# Patient Record
Sex: FEMALE | Race: WHITE | NOT HISPANIC OR LATINO | Employment: UNEMPLOYED | URBAN - METROPOLITAN AREA
[De-identification: names, ages, dates, MRNs, and addresses within clinical notes are randomized per-mention and may not be internally consistent; named-entity substitution may affect disease eponyms.]

---

## 2021-05-10 LAB
EXTERNAL HEPATITIS B SURFACE ANTIGEN: NEGATIVE
EXTERNAL HEPATITIS C AB: NEGATIVE
EXTERNAL RUBELLA QUALITATIVE: NORMAL
EXTERNAL VDRL: NONREACTIVE
HIV1 P24 AG SERPL QL IA: NEGATIVE

## 2021-05-17 ENCOUNTER — HOSPITAL ENCOUNTER (OUTPATIENT)
Dept: ULTRASOUND IMAGING | Facility: HOSPITAL | Age: 41
Discharge: HOME | End: 2021-05-17
Attending: OBSTETRICS & GYNECOLOGY

## 2021-06-11 ENCOUNTER — HOSPITAL ENCOUNTER (OUTPATIENT)
Dept: MRI IMAGING | Facility: HOSPITAL | Age: 41
Discharge: HOME | End: 2021-06-11

## 2021-06-11 ENCOUNTER — TRANSCRIBE ORDERS (OUTPATIENT)
Dept: ADMINISTRATIVE | Facility: HOSPITAL | Age: 41
End: 2021-06-11

## 2021-06-11 DIAGNOSIS — O02.1 FETAL DEMISE BEFORE 20 WEEKS WITH RETENTION OF DEAD FETUS: Primary | ICD-10-CM

## 2021-06-11 DIAGNOSIS — O02.1 FETAL DEMISE BEFORE 20 WEEKS WITH RETENTION OF DEAD FETUS: ICD-10-CM

## 2021-06-11 PROCEDURE — 72197 MRI PELVIS W/O & W/DYE: CPT

## 2021-06-11 PROCEDURE — A9577 INJ MULTIHANCE: HCPCS | Performed by: OBSTETRICS & GYNECOLOGY

## 2021-06-11 PROCEDURE — 0 GADOBENATE DIMEGLUMINE 529 MG/ML SOLUTION: Performed by: OBSTETRICS & GYNECOLOGY

## 2021-06-11 RX ADMIN — GADOBENATE DIMEGLUMINE 20 ML: 529 INJECTION, SOLUTION INTRAVENOUS at 15:42

## 2021-06-14 ENCOUNTER — PREP FOR SURGERY (OUTPATIENT)
Dept: OTHER | Facility: HOSPITAL | Age: 41
End: 2021-06-14

## 2021-06-14 ENCOUNTER — TRANSCRIBE ORDERS (OUTPATIENT)
Dept: LAB | Facility: HOSPITAL | Age: 41
End: 2021-06-14

## 2021-06-14 DIAGNOSIS — Z01.818 PREOP TESTING: Primary | ICD-10-CM

## 2021-06-14 DIAGNOSIS — O02.1 IUFD AT LESS THAN 20 WEEKS OF GESTATION: Primary | ICD-10-CM

## 2021-06-14 RX ORDER — TRISODIUM CITRATE DIHYDRATE AND CITRIC ACID MONOHYDRATE 500; 334 MG/5ML; MG/5ML
30 SOLUTION ORAL ONCE AS NEEDED
Status: CANCELLED | OUTPATIENT
Start: 2021-06-14

## 2021-06-14 RX ORDER — FAMOTIDINE 10 MG/ML
20 INJECTION, SOLUTION INTRAVENOUS 2 TIMES DAILY PRN
Status: CANCELLED | OUTPATIENT
Start: 2021-06-14

## 2021-06-14 RX ORDER — FAMOTIDINE 20 MG/1
20 TABLET, FILM COATED ORAL ONCE AS NEEDED
Status: CANCELLED | OUTPATIENT
Start: 2021-06-14

## 2021-06-14 RX ORDER — FAMOTIDINE 20 MG/1
20 TABLET, FILM COATED ORAL 2 TIMES DAILY PRN
Status: CANCELLED | OUTPATIENT
Start: 2021-06-14

## 2021-06-14 RX ORDER — HYDROCODONE BITARTRATE AND ACETAMINOPHEN 10; 325 MG/1; MG/1
1 TABLET ORAL EVERY 4 HOURS PRN
Status: CANCELLED | OUTPATIENT
Start: 2021-06-14 | End: 2021-06-21

## 2021-06-14 RX ORDER — MORPHINE SULFATE 5 MG/ML
5 INJECTION, SOLUTION INTRAMUSCULAR; INTRAVENOUS
Status: CANCELLED | OUTPATIENT
Start: 2021-06-14

## 2021-06-14 RX ORDER — ACETAMINOPHEN 325 MG/1
650 TABLET ORAL EVERY 6 HOURS
Status: CANCELLED | OUTPATIENT
Start: 2021-06-14

## 2021-06-14 RX ORDER — ONDANSETRON 4 MG/1
4 TABLET, FILM COATED ORAL EVERY 6 HOURS PRN
Status: CANCELLED | OUTPATIENT
Start: 2021-06-14

## 2021-06-14 RX ORDER — SODIUM CHLORIDE 0.9 % (FLUSH) 0.9 %
10 SYRINGE (ML) INJECTION AS NEEDED
Status: CANCELLED | OUTPATIENT
Start: 2021-06-14

## 2021-06-14 RX ORDER — FAMOTIDINE 10 MG/ML
20 INJECTION, SOLUTION INTRAVENOUS ONCE AS NEEDED
Status: CANCELLED | OUTPATIENT
Start: 2021-06-14

## 2021-06-14 RX ORDER — TERBUTALINE SULFATE 1 MG/ML
0.25 INJECTION, SOLUTION SUBCUTANEOUS AS NEEDED
Status: CANCELLED | OUTPATIENT
Start: 2021-06-14

## 2021-06-14 RX ORDER — HYDROCODONE BITARTRATE AND ACETAMINOPHEN 5; 325 MG/1; MG/1
1 TABLET ORAL EVERY 4 HOURS PRN
Status: CANCELLED | OUTPATIENT
Start: 2021-06-14 | End: 2021-06-21

## 2021-06-14 RX ORDER — OXYTOCIN-SODIUM CHLORIDE 0.9% IV SOLN 30 UNIT/500ML 30-0.9/5 UT/ML-%
125 SOLUTION INTRAVENOUS ONCE
Status: CANCELLED | OUTPATIENT
Start: 2021-06-14 | End: 2021-06-14

## 2021-06-14 RX ORDER — PROMETHAZINE HYDROCHLORIDE 12.5 MG/1
12.5 TABLET ORAL EVERY 6 HOURS PRN
Status: CANCELLED | OUTPATIENT
Start: 2021-06-14

## 2021-06-14 RX ORDER — DIPHENHYDRAMINE HCL 25 MG
25 CAPSULE ORAL EVERY 6 HOURS PRN
Status: CANCELLED | OUTPATIENT
Start: 2021-06-14

## 2021-06-14 RX ORDER — PROMETHAZINE HYDROCHLORIDE 25 MG/1
25 TABLET ORAL EVERY 6 HOURS PRN
Status: CANCELLED | OUTPATIENT
Start: 2021-06-14

## 2021-06-14 RX ORDER — MISOPROSTOL 200 UG/1
800 TABLET ORAL AS NEEDED
Status: CANCELLED | OUTPATIENT
Start: 2021-06-14

## 2021-06-14 RX ORDER — DIPHENHYDRAMINE HYDROCHLORIDE 50 MG/ML
25 INJECTION INTRAMUSCULAR; INTRAVENOUS EVERY 6 HOURS PRN
Status: CANCELLED | OUTPATIENT
Start: 2021-06-14

## 2021-06-14 RX ORDER — ACETAMINOPHEN 325 MG/1
650 TABLET ORAL EVERY 4 HOURS PRN
Status: CANCELLED | OUTPATIENT
Start: 2021-06-14

## 2021-06-14 RX ORDER — IBUPROFEN 800 MG/1
800 TABLET ORAL EVERY 8 HOURS SCHEDULED
Status: CANCELLED | OUTPATIENT
Start: 2021-06-14

## 2021-06-14 RX ORDER — ONDANSETRON 2 MG/ML
4 INJECTION INTRAMUSCULAR; INTRAVENOUS EVERY 6 HOURS PRN
Status: CANCELLED | OUTPATIENT
Start: 2021-06-14

## 2021-06-14 RX ORDER — LIDOCAINE HYDROCHLORIDE 10 MG/ML
5 INJECTION, SOLUTION EPIDURAL; INFILTRATION; INTRACAUDAL; PERINEURAL AS NEEDED
Status: CANCELLED | OUTPATIENT
Start: 2021-06-14

## 2021-06-14 RX ORDER — MISOPROSTOL 200 UG/1
200 TABLET ORAL
Status: CANCELLED | OUTPATIENT
Start: 2021-06-14 | End: 2021-06-15

## 2021-06-14 RX ORDER — METHYLERGONOVINE MALEATE 0.2 MG/ML
200 INJECTION INTRAVENOUS ONCE AS NEEDED
Status: CANCELLED | OUTPATIENT
Start: 2021-06-14

## 2021-06-14 RX ORDER — CARBOPROST TROMETHAMINE 250 UG/ML
250 INJECTION, SOLUTION INTRAMUSCULAR ONCE AS NEEDED
Status: CANCELLED | OUTPATIENT
Start: 2021-06-14

## 2021-06-14 RX ORDER — SODIUM CHLORIDE 0.9 % (FLUSH) 0.9 %
3 SYRINGE (ML) INJECTION EVERY 12 HOURS SCHEDULED
Status: CANCELLED | OUTPATIENT
Start: 2021-06-14

## 2021-06-14 RX ORDER — SODIUM CHLORIDE, SODIUM LACTATE, POTASSIUM CHLORIDE, CALCIUM CHLORIDE 600; 310; 30; 20 MG/100ML; MG/100ML; MG/100ML; MG/100ML
150 INJECTION, SOLUTION INTRAVENOUS CONTINUOUS
Status: CANCELLED | OUTPATIENT
Start: 2021-06-14

## 2021-06-14 NOTE — H&P
OB HISTORY AND PHYSICAL    40 y.o. female [id number] currently at 17+ weeks by an estimated due date of 2021.  Patient has intrauterine fetal demise diagnosed on .  Ultrasound indicates fetus at 15+4 weeks.    Prenatal care is complicated by: Advanced maternal age, heterozygote for factor V Leiden-plan for Lovenox postpartum    CC: IOL    HPI:  Presents with scheduled IOL    ROS: All negative except listed in HPI    OB History    x5.  History of SAB x2, blighted ovum x1    Prenatal Labs:  Reviewed, see PNR, labs of note flow positive, rubella immune    Past medical history: Factor V Leiden, heterozygote.  MTHFR with normal homocystine, migraines, seasonal allergies    Home Medications: Stopped baby aspirin, prenatal vitamin       Allergies: No known drug allergies    No past surgical history on file.    Social History: , Gaudencio.  Denies alcohol drug or tobacco use.    Family History: Non contributory    Immunizations: See prenatal record for Tdap, Flu, and/or Covid vaccinations    PHYSICAL EXAM:    Vitals: Reviewed in prenatal record and/or Epic/OBIX/AirStrip     VS of note: All within normal limits    General- NAD, alert and oriented, appropriate  Psych- normal mood, good memory  CV- Regular rhythm, no murnurs  Resp- CTA to bases, no wheezes  Abdomen- Gravid, non tender  Fundus-  Size: EFW 5 ounces, 128 g  Cvx-closed  No fetal heart rate      ASSESSMENT:  ~17+ weeks by LMP, 15+ weeks IUFD  ~ Factor V Leiden heterozygote with no personal history of thromboembolic disease      PLAN:  ~  Admit, Epidural prn  ~  IOL: Cytotec   ~  Counseling:Specifically with Cytotec, she understands that it is endorsed by the [employer] College of [employer]GY[employer], but not FDA approved for the induction of labor.The patient was counseled on the risks, benefits and alternatives of Induction.  Risks reviewed, but are not limited to: bleeding, transfusion, fetal intolerance,  (possibly emergent),  and uterine rupture.   She declines expectant management or  and desires induction.  All her questions have been answered to her satisfaction and she desires to proceed.  They understand approximately 50% chance of retained placenta and need for dilation and curettage.  ~  Plan of care NLT hospital course, R/B/A/potential SE, length 24-48 hours have been reviewed with patient and her , questions answered to her/his/their satisfaction.  Pt desires to proceed as above.      Electronically signed by Faith Leal DO, 21, 6:45 PM EDT.

## 2021-06-16 ENCOUNTER — HOSPITAL ENCOUNTER (INPATIENT)
Facility: HOSPITAL | Age: 41
LOS: 1 days | Discharge: HOME | End: 2021-06-17
Attending: OBSTETRICS & GYNECOLOGY | Admitting: OBSTETRICS & GYNECOLOGY

## 2021-06-16 DIAGNOSIS — O02.1 IUFD AT LESS THAN 20 WEEKS OF GESTATION: ICD-10-CM

## 2021-06-16 LAB
ABO GROUP BLD: NORMAL
ABO GROUP BLD: NORMAL
BLD GP AB SCN SERPL QL: NEGATIVE
DEPRECATED RDW RBC AUTO: 45.6 FL (ref 37–54)
ERYTHROCYTE [DISTWIDTH] IN BLOOD BY AUTOMATED COUNT: 13.5 % (ref 12.3–15.4)
HCT VFR BLD AUTO: 36.6 % (ref 34–46.6)
HGB BLD-MCNC: 12.7 G/DL (ref 12–15.9)
MCH RBC QN AUTO: 32.2 PG (ref 26.6–33)
MCHC RBC AUTO-ENTMCNC: 34.7 G/DL (ref 31.5–35.7)
MCV RBC AUTO: 92.7 FL (ref 79–97)
PLATELET # BLD AUTO: 253 10*3/MM3 (ref 140–450)
PMV BLD AUTO: 9.3 FL (ref 6–12)
RBC # BLD AUTO: 3.95 10*6/MM3 (ref 3.77–5.28)
RH BLD: POSITIVE
RH BLD: POSITIVE
T&S EXPIRATION DATE: NORMAL
WBC # BLD AUTO: 7.54 10*3/MM3 (ref 3.4–10.8)

## 2021-06-16 PROCEDURE — 85027 COMPLETE CBC AUTOMATED: CPT | Performed by: OBSTETRICS & GYNECOLOGY

## 2021-06-16 PROCEDURE — 86900 BLOOD TYPING SEROLOGIC ABO: CPT | Performed by: OBSTETRICS & GYNECOLOGY

## 2021-06-16 PROCEDURE — 25010000002 ENOXAPARIN PER 10 MG: Performed by: OBSTETRICS & GYNECOLOGY

## 2021-06-16 PROCEDURE — 88300 SURGICAL PATH GROSS: CPT | Performed by: OBSTETRICS & GYNECOLOGY

## 2021-06-16 PROCEDURE — 86900 BLOOD TYPING SEROLOGIC ABO: CPT

## 2021-06-16 PROCEDURE — 3E0P7VZ INTRODUCTION OF HORMONE INTO FEMALE REPRODUCTIVE, VIA NATURAL OR ARTIFICIAL OPENING: ICD-10-PCS | Performed by: OBSTETRICS & GYNECOLOGY

## 2021-06-16 PROCEDURE — 86901 BLOOD TYPING SEROLOGIC RH(D): CPT | Performed by: OBSTETRICS & GYNECOLOGY

## 2021-06-16 PROCEDURE — 86901 BLOOD TYPING SEROLOGIC RH(D): CPT

## 2021-06-16 PROCEDURE — 86850 RBC ANTIBODY SCREEN: CPT | Performed by: OBSTETRICS & GYNECOLOGY

## 2021-06-16 RX ORDER — HYDROCODONE BITARTRATE AND ACETAMINOPHEN 5; 325 MG/1; MG/1
1 TABLET ORAL EVERY 4 HOURS PRN
Status: DISCONTINUED | OUTPATIENT
Start: 2021-06-16 | End: 2021-06-17 | Stop reason: HOSPADM

## 2021-06-16 RX ORDER — IBUPROFEN 800 MG/1
800 TABLET ORAL EVERY 8 HOURS SCHEDULED
Status: DISCONTINUED | OUTPATIENT
Start: 2021-06-16 | End: 2021-06-16

## 2021-06-16 RX ORDER — SODIUM CHLORIDE 0.9 % (FLUSH) 0.9 %
10 SYRINGE (ML) INJECTION AS NEEDED
Status: DISCONTINUED | OUTPATIENT
Start: 2021-06-16 | End: 2021-06-17 | Stop reason: HOSPADM

## 2021-06-16 RX ORDER — TERBUTALINE SULFATE 1 MG/ML
0.25 INJECTION, SOLUTION SUBCUTANEOUS AS NEEDED
Status: DISCONTINUED | OUTPATIENT
Start: 2021-06-16 | End: 2021-06-17 | Stop reason: HOSPADM

## 2021-06-16 RX ORDER — ONDANSETRON 4 MG/1
4 TABLET, FILM COATED ORAL EVERY 8 HOURS PRN
Status: DISCONTINUED | OUTPATIENT
Start: 2021-06-16 | End: 2021-06-17 | Stop reason: HOSPADM

## 2021-06-16 RX ORDER — ONDANSETRON 2 MG/ML
4 INJECTION INTRAMUSCULAR; INTRAVENOUS EVERY 6 HOURS PRN
Status: DISCONTINUED | OUTPATIENT
Start: 2021-06-16 | End: 2021-06-17 | Stop reason: HOSPADM

## 2021-06-16 RX ORDER — HYDROCODONE BITARTRATE AND ACETAMINOPHEN 10; 325 MG/1; MG/1
1 TABLET ORAL EVERY 4 HOURS PRN
Status: DISCONTINUED | OUTPATIENT
Start: 2021-06-16 | End: 2021-06-17 | Stop reason: HOSPADM

## 2021-06-16 RX ORDER — ONDANSETRON 4 MG/1
4 TABLET, FILM COATED ORAL EVERY 6 HOURS PRN
Status: DISCONTINUED | OUTPATIENT
Start: 2021-06-16 | End: 2021-06-17 | Stop reason: HOSPADM

## 2021-06-16 RX ORDER — LIDOCAINE HYDROCHLORIDE 10 MG/ML
5 INJECTION, SOLUTION EPIDURAL; INFILTRATION; INTRACAUDAL; PERINEURAL AS NEEDED
Status: DISCONTINUED | OUTPATIENT
Start: 2021-06-16 | End: 2021-06-17 | Stop reason: HOSPADM

## 2021-06-16 RX ORDER — METHYLERGONOVINE MALEATE 0.2 MG/ML
200 INJECTION INTRAVENOUS ONCE AS NEEDED
Status: DISCONTINUED | OUTPATIENT
Start: 2021-06-16 | End: 2021-06-17 | Stop reason: HOSPADM

## 2021-06-16 RX ORDER — SODIUM CHLORIDE 0.9 % (FLUSH) 0.9 %
1-10 SYRINGE (ML) INJECTION AS NEEDED
Status: DISCONTINUED | OUTPATIENT
Start: 2021-06-16 | End: 2021-06-17 | Stop reason: HOSPADM

## 2021-06-16 RX ORDER — MISOPROSTOL 100 UG/1
400 TABLET ORAL ONCE
Status: COMPLETED | OUTPATIENT
Start: 2021-06-16 | End: 2021-06-16

## 2021-06-16 RX ORDER — ACETAMINOPHEN 325 MG/1
650 TABLET ORAL EVERY 4 HOURS PRN
Status: DISCONTINUED | OUTPATIENT
Start: 2021-06-16 | End: 2021-06-17 | Stop reason: HOSPADM

## 2021-06-16 RX ORDER — ACETAMINOPHEN 325 MG/1
650 TABLET ORAL EVERY 6 HOURS PRN
COMMUNITY
End: 2023-02-22

## 2021-06-16 RX ORDER — SODIUM CHLORIDE, SODIUM LACTATE, POTASSIUM CHLORIDE, CALCIUM CHLORIDE 600; 310; 30; 20 MG/100ML; MG/100ML; MG/100ML; MG/100ML
150 INJECTION, SOLUTION INTRAVENOUS CONTINUOUS
Status: DISCONTINUED | OUTPATIENT
Start: 2021-06-16 | End: 2021-06-17

## 2021-06-16 RX ORDER — CETIRIZINE HYDROCHLORIDE 10 MG/1
10 TABLET ORAL DAILY
COMMUNITY

## 2021-06-16 RX ORDER — CALCIUM CARBONATE 200(500)MG
2 TABLET,CHEWABLE ORAL 3 TIMES DAILY PRN
Status: DISCONTINUED | OUTPATIENT
Start: 2021-06-16 | End: 2021-06-17 | Stop reason: HOSPADM

## 2021-06-16 RX ORDER — FAMOTIDINE 10 MG/ML
20 INJECTION, SOLUTION INTRAVENOUS ONCE AS NEEDED
Status: DISCONTINUED | OUTPATIENT
Start: 2021-06-16 | End: 2021-06-17 | Stop reason: HOSPADM

## 2021-06-16 RX ORDER — DIPHENHYDRAMINE HYDROCHLORIDE 50 MG/ML
25 INJECTION INTRAMUSCULAR; INTRAVENOUS EVERY 6 HOURS PRN
Status: DISCONTINUED | OUTPATIENT
Start: 2021-06-16 | End: 2021-06-17 | Stop reason: HOSPADM

## 2021-06-16 RX ORDER — FOLIC ACID 1 MG/1
1 TABLET ORAL DAILY
COMMUNITY
End: 2021-06-17 | Stop reason: HOSPADM

## 2021-06-16 RX ORDER — FAMOTIDINE 10 MG/ML
20 INJECTION, SOLUTION INTRAVENOUS 2 TIMES DAILY PRN
Status: DISCONTINUED | OUTPATIENT
Start: 2021-06-16 | End: 2021-06-17 | Stop reason: HOSPADM

## 2021-06-16 RX ORDER — TRISODIUM CITRATE DIHYDRATE AND CITRIC ACID MONOHYDRATE 500; 334 MG/5ML; MG/5ML
30 SOLUTION ORAL ONCE AS NEEDED
Status: DISCONTINUED | OUTPATIENT
Start: 2021-06-16 | End: 2021-06-17 | Stop reason: HOSPADM

## 2021-06-16 RX ORDER — OXYTOCIN-SODIUM CHLORIDE 0.9% IV SOLN 30 UNIT/500ML 30-0.9/5 UT/ML-%
125 SOLUTION INTRAVENOUS ONCE
Status: COMPLETED | OUTPATIENT
Start: 2021-06-16 | End: 2021-06-16

## 2021-06-16 RX ORDER — SODIUM CHLORIDE 0.9 % (FLUSH) 0.9 %
3 SYRINGE (ML) INJECTION EVERY 12 HOURS SCHEDULED
Status: DISCONTINUED | OUTPATIENT
Start: 2021-06-16 | End: 2021-06-17

## 2021-06-16 RX ORDER — CARBOPROST TROMETHAMINE 250 UG/ML
250 INJECTION, SOLUTION INTRAMUSCULAR ONCE AS NEEDED
Status: DISCONTINUED | OUTPATIENT
Start: 2021-06-16 | End: 2021-06-17 | Stop reason: HOSPADM

## 2021-06-16 RX ORDER — PROMETHAZINE HYDROCHLORIDE 25 MG/1
25 TABLET ORAL EVERY 6 HOURS PRN
Status: DISCONTINUED | OUTPATIENT
Start: 2021-06-16 | End: 2021-06-17 | Stop reason: HOSPADM

## 2021-06-16 RX ORDER — FAMOTIDINE 20 MG/1
20 TABLET, FILM COATED ORAL ONCE AS NEEDED
Status: DISCONTINUED | OUTPATIENT
Start: 2021-06-16 | End: 2021-06-17 | Stop reason: HOSPADM

## 2021-06-16 RX ORDER — MORPHINE SULFATE 5 MG/ML
5 INJECTION, SOLUTION INTRAMUSCULAR; INTRAVENOUS
Status: DISCONTINUED | OUTPATIENT
Start: 2021-06-16 | End: 2021-06-17 | Stop reason: HOSPADM

## 2021-06-16 RX ORDER — MISOPROSTOL 100 UG/1
200 TABLET ORAL
Status: DISCONTINUED | OUTPATIENT
Start: 2021-06-16 | End: 2021-06-16

## 2021-06-16 RX ORDER — ASPIRIN 81 MG/1
81 TABLET, CHEWABLE ORAL DAILY
COMMUNITY
End: 2021-06-17 | Stop reason: HOSPADM

## 2021-06-16 RX ORDER — DIPHENHYDRAMINE HCL 25 MG
25 CAPSULE ORAL EVERY 6 HOURS PRN
Status: DISCONTINUED | OUTPATIENT
Start: 2021-06-16 | End: 2021-06-17 | Stop reason: HOSPADM

## 2021-06-16 RX ORDER — OXYTOCIN-SODIUM CHLORIDE 0.9% IV SOLN 30 UNIT/500ML 30-0.9/5 UT/ML-%
SOLUTION INTRAVENOUS
Status: COMPLETED
Start: 2021-06-16 | End: 2021-06-16

## 2021-06-16 RX ORDER — FAMOTIDINE 20 MG/1
20 TABLET, FILM COATED ORAL 2 TIMES DAILY PRN
Status: DISCONTINUED | OUTPATIENT
Start: 2021-06-16 | End: 2021-06-17 | Stop reason: HOSPADM

## 2021-06-16 RX ORDER — MISOPROSTOL 200 UG/1
TABLET ORAL
Status: COMPLETED
Start: 2021-06-16 | End: 2021-06-16

## 2021-06-16 RX ORDER — MISOPROSTOL 200 UG/1
800 TABLET ORAL AS NEEDED
Status: DISCONTINUED | OUTPATIENT
Start: 2021-06-16 | End: 2021-06-17 | Stop reason: HOSPADM

## 2021-06-16 RX ORDER — ACETAMINOPHEN 325 MG/1
650 TABLET ORAL EVERY 6 HOURS
Status: DISCONTINUED | OUTPATIENT
Start: 2021-06-16 | End: 2021-06-17 | Stop reason: HOSPADM

## 2021-06-16 RX ORDER — PROMETHAZINE HYDROCHLORIDE 12.5 MG/1
12.5 TABLET ORAL EVERY 6 HOURS PRN
Status: DISCONTINUED | OUTPATIENT
Start: 2021-06-16 | End: 2021-06-17 | Stop reason: HOSPADM

## 2021-06-16 RX ADMIN — Medication 3 ML: at 21:50

## 2021-06-16 RX ADMIN — SODIUM CHLORIDE, POTASSIUM CHLORIDE, SODIUM LACTATE AND CALCIUM CHLORIDE 150 ML/HR: 600; 310; 30; 20 INJECTION, SOLUTION INTRAVENOUS at 16:11

## 2021-06-16 RX ADMIN — ACETAMINOPHEN 650 MG: 325 TABLET ORAL at 23:50

## 2021-06-16 RX ADMIN — OXYTOCIN 30 UNITS: 10 INJECTION, SOLUTION INTRAMUSCULAR; INTRAVENOUS at 16:31

## 2021-06-16 RX ADMIN — MISOPROSTOL 200 MCG: 200 TABLET ORAL at 13:39

## 2021-06-16 RX ADMIN — MISOPROSTOL 200 MCG: 100 TABLET ORAL at 13:39

## 2021-06-16 RX ADMIN — Medication 3 ML: at 13:40

## 2021-06-16 RX ADMIN — OXYTOCIN-SODIUM CHLORIDE 0.9% IV SOLN 30 UNIT/500ML 30 UNITS: 30-0.9/5 SOLUTION at 16:31

## 2021-06-16 RX ADMIN — MISOPROSTOL 400 MCG: 100 TABLET ORAL at 13:40

## 2021-06-16 RX ADMIN — SODIUM CHLORIDE, POTASSIUM CHLORIDE, SODIUM LACTATE AND CALCIUM CHLORIDE 150 ML/HR: 600; 310; 30; 20 INJECTION, SOLUTION INTRAVENOUS at 08:44

## 2021-06-16 RX ADMIN — ACETAMINOPHEN 650 MG: 325 TABLET ORAL at 18:52

## 2021-06-16 RX ADMIN — ENOXAPARIN SODIUM 30 MG: 30 INJECTION SUBCUTANEOUS at 22:00

## 2021-06-16 NOTE — NURSING NOTE
FSN in to see Patient and her spouse shortly after delivery of their 17 gestation son. Patient states she was more tearful last Friday. She was able to answer multiple questions and is tearful at times. She has agreed to allow FSN to call for a follow up in a few days.   Infant information provided to the  as requested. Patient states she is spending the night. Momentos made and provided to patient and her spouse.

## 2021-06-16 NOTE — NURSING NOTE
VERNAN Called GM and discussed this fetal demise. Case # [id number] and talked with Sarahi Monzon.

## 2021-06-16 NOTE — L&D DELIVERY NOTE
Vaginal Delivery Note    Patient progressed to complete, complete after 2 doses of Cytotec vaginally. Her first dose was 200 mcg and second dose was 400 mcg. She felt pressure and had a little bleeding and fetus delivered with an intact bag and placenta. Small segment of accessory placenta was in the vagina and removed easily. On vaginal exam, cervix was closed with appropriate lochia noted.     Patient desired to visualize the fetus in the bag and then I performed rupture of membranes on the fetal warmer. Fluid was clear without odor and nonbloody. Fetus had a cord wrapped around the neck and body, very loosely. Gross anatomic survey was within normal limits. Male fetus confirmed. The umbilical cord was cut and the fetus was wrapped in a dry blanket, hat was placed and he was brought to the patient to hold.     Patient tolerated delivery very well, has excellent coping skills. I have discussed in detail the option for genetic testing and sending the fetus and/or placenta to pathology. At this time they have decided on no more children, and decline all further evaluation for etiology (decline sending placenta and/or fetus to pathology). They do not desire to bury the fetus.  has been in to see the patient. She declines any further  visits.   at bedside, very supportive.    Laceration: No     EBL:  20 mL       Date:  6/16/21   GA: 17+5 by WENDY, 15+ by US     Anesthesia: None         Infant: Weight: 64gms (2.3oz)  Length: 15cm (6in)   APGARs: NA           Complications: None        Faith Leal DO  06/16/21  16:46 EDT

## 2021-06-16 NOTE — CONSULTS
Pt is currently on L&D being induced at 17 weeks gestation due to a IUFD.  Baby is expected to be a boy.     Introductions are made and my role explained.    No needs at this time.

## 2021-06-17 VITALS
RESPIRATION RATE: 18 BRPM | HEIGHT: 67 IN | BODY MASS INDEX: 28.88 KG/M2 | TEMPERATURE: 98.4 F | HEART RATE: 61 BPM | WEIGHT: 184 LBS | OXYGEN SATURATION: 99 % | SYSTOLIC BLOOD PRESSURE: 111 MMHG | DIASTOLIC BLOOD PRESSURE: 96 MMHG

## 2021-06-17 PROBLEM — O02.1 IUFD AT LESS THAN 20 WEEKS OF GESTATION: Status: ACTIVE | Noted: 2021-06-17

## 2021-06-17 RX ORDER — ACETAMINOPHEN AND CODEINE PHOSPHATE 120; 12 MG/5ML; MG/5ML
1 SOLUTION ORAL DAILY
Qty: 28 TABLET | Refills: 12 | Status: SHIPPED | OUTPATIENT
Start: 2021-06-17 | End: 2022-06-27 | Stop reason: SDUPTHER

## 2021-06-17 RX ADMIN — ACETAMINOPHEN 650 MG: 325 TABLET ORAL at 05:31

## 2021-06-17 NOTE — PLAN OF CARE
Goal Outcome Evaluation:                 Problem:  Loss  Goal: Optimal Adjustment to Loss  Intervention: Support Patient and Family Grieving Process  Flowsheets (Taken 2021 9291)  Supportive Measures: active listening utilized  Family/Support System Care: support provided

## 2021-06-17 NOTE — DISCHARGE SUMMARY
OB Discharge Summary      Admit Date:  2021  Date of Delivery: 2021   Discharge Date: 21    Reason for Admission:  IUFD    Final Diagnosis:  `17w5d by WENDY, 15+ by US, IUFD, s/p delivery    Antepartum:  Prenatal care is complicated by:  Thrombophilia (FVL hetero) and Hx of IUFD    Intrapartum/Delivery:  OB Surgeon:  Faith Leal DO  Anesthesia: None  Delivery Type:   Perineum: Intact    Infant: male  infant;     Weight: 65 g (2.3 oz)      APGARS: 0  @ 1 minute / 0  @ 5 minutes    Hospital Course/Significant Findings:  Patient arrived for scheduled induction.  She had 2 doses of Cytotec vaginally and delivered fetus in call and placenta intact.  Patient coped very well with delivery, declined any pathologic or chromosomal analysis of fetus or placenta.    Discharge:    Disposition: Home     Discharge Medications      New Medications      Instructions Start Date   enoxaparin 30 MG/0.3ML solution syringe  Commonly known as: Lovenox   30 mg, Subcutaneous, Every 24 Hours Scheduled      norethindrone 0.35 MG tablet  Commonly known as: MICRONOR   0.35 mg, Oral, Daily         Continue These Medications      Instructions Start Date   acetaminophen 325 MG tablet  Commonly known as: TYLENOL   650 mg, Oral, Every 6 Hours PRN      cetirizine 10 MG tablet  Commonly known as: zyrTEC   10 mg, Oral, Daily      doxylamine 25 MG tablet  Commonly known as: UNISOM   25 mg, Oral, Nightly PRN         Stop These Medications    aspirin 81 MG chewable tablet     folic acid 1 MG tablet  Commonly known as: FOLVITE            Diet: Regular    Pelvic Rest: 6 weeks    Condition at discharge: Good    Follow up with: Faith Leal DO or provider of her choice    Follow up in: 1 weeks    Complications: None

## 2021-06-17 NOTE — PROGRESS NOTES
PostPartum/PostOp PROGRESS NOTE        Subjective:  Patient has no complaints, Pain controlled, Tolerating a regular diet, Ambulating, Urinating spontaneously and Lochia decreasing, no bleeding concerns      Objective:     Vitals: reviewed  General- NAd, alert and oriented, appropriate  Psych- normal mood, good memory  Abdomen- Soft, non distended, non tender    New labs/imagining/other reviewed, of note: NA       Assessment:    `Post-partum/postop Day:  1  `Hx of IUFD    Plan:     `Routine postpartum/postop care      `Discharge home, DC meds reviewed, Follow up scheduled, PP/PO precautions given          Electronically signed by Faith Leal DO, 06/17/21, 9:05 AM EDT.

## 2021-06-17 NOTE — NURSING NOTE
FSN in to see patient and her spouse this am. They are still very sure about hospital disposal. They appear to have good coping skills. FSN discussed possible breast fullness and management of this. FSN discussed periodic episodes of grief triggers and provided FSN contact information if they should have questions after discharge. Patient has agreed to a follow up phone call and provided an alternate phone number to call.

## 2021-06-18 LAB
LAB AP CASE REPORT: NORMAL
LAB AP CLINICAL INFORMATION: NORMAL
PATH REPORT.FINAL DX SPEC: NORMAL
PATH REPORT.GROSS SPEC: NORMAL

## 2021-06-30 ENCOUNTER — TELEPHONE (OUTPATIENT)
Dept: LACTATION | Facility: HOSPITAL | Age: 41
End: 2021-06-30

## 2021-06-30 NOTE — TELEPHONE ENCOUNTER
FSN attempted to call this patient after the loss of her pregnancy at 17 weeks but was unable to reach her. Patient had given this nurse permission to leave a message and FSN left a message to call as needed.

## 2022-06-24 NOTE — PROGRESS NOTES
"Well Woman Visit    CC: Scheduled annual well gyn visit  Chief Complaint   Patient presents with   • Gynecologic Exam     Questions about aspirin and folic acid       Myriad intake in the past?: No        Social History     Substance and Sexual Activity   Sexual Activity Yes   • Partners: Male   • Birth control/protection: OCP       HPI:   41 y.o.     Menses:   q 34 days, lasts 3-4 days, changes products q 8hrs on heaviest days, uses cup.     Pain:  None     Vulvar itching, all outside, no discharge    PCP: does not have PCP  History: PMHx, Meds, Allergies, PSHx, Social Hx, and POBHx all reviewed and updated.      PHYSICAL EXAM:  /72   Ht 175.3 cm (69\")   Wt 91.6 kg (202 lb)   LMP 2022   BMI 29.83 kg/m²  Not found.  General- NAD, alert and oriented, appropriate  Psych- Normal mood, good memory  Neck- No masses, no thyroid enlargement  CV- Regular rhythm, no murnurs  Resp- CTA to bases, no wheezes  Abdomen- Soft, non distended, non tender, no masses    Breast left-  Bilaterally symmetrical, no masses, non tender, no nipple discharge  Breast right- Bilaterally symmetrical, no masses, non tender, no nipple discharge    External genitalia- Normal female, no lesions  Urethra/meatus- Normal, no masses, non tender  Bladder- Normal, no masses, non tender  Vagina- Normal, no atrophy, no lesions, no discharge.  Prolapse: No prolapse  Cvx- Normal, no lesions, no discharge, No cervical motion tenderness  Uterus- Normal size, shape & consistency.  Non tender, mobile, & no prolapse  Adnexa- No mass, non tender  Anus/Rectum/Perineum- Not performed    Lymphatic- No palpable neck, axillary, or groin nodes  Ext- No edema, no cyanosis    Skin- No lesions, no rashes, no acanthosis nigricans      ASSESSMENT and PLAN:    Diagnoses and all orders for this visit:    1. Encounter for gynecological examination without abnormal finding (Primary)  -     IgP, Aptima HPV  -     Mammo Screening Digital Tomosynthesis " Bilateral With CAD  -     CBC (No Diff)  -     Comprehensive Metabolic Panel  -     Hemoglobin A1c  -     Lipid Panel  -     TSH    2. Birth control counseling  -     norethindrone (MICRONOR) 0.35 MG tablet; Take 1 tablet by mouth Daily.  Dispense: 84 tablet; Refill: 3    3. Other microscopic hematuria  -     Urine Culture - Urine, Urine, Clean Catch  -     Urinalysis With Microscopic - Urine, Clean Catch    4. Acute vulvitis  -     miconazole (MICOTIN) 2 % vaginal cream; Apply to itchy area on vulva nightly for 7 nights  Dispense: 7 g; Refill: 0    5. Weight gain  -     TSH  -     T4, Free    6. Dysuria  -     POC Urinalysis Dipstick    Non fasting labs today  Rec FU PCP re need for baby ASA and folic acid outside of preg    Preventative:  • BREAST HEALTH- Monthly self breast exam importance and how to reviewed. MMG and/or MRI (prn) reviewed per society guidelines and her individual history. Screen: Not medically needed  • CERVICAL CANCER Screening- Reviewed current ASCCP guidelines for screening w and wo cotest HPV, age specific.  Screen: Updated today  • COLON CANCER Screening- Reviewed current medical society guidelines and options.  Screen:  Not medically needed  • Importance of WEIGHT MANAGEMENT, nutrition, and exercise reviewed  • BONE HEALTH- Reviewed current medical society guidelines and options for testing, calcium and vit D intake.  Weight bearing exercise.  DEXA: Not medically needed  • VACCINATIONS Recommended: COVID and booster PRN, Flu annually, Gardisil/HPV vaccine (up to 46yo), Tdap [id number].  Importance discussed, risk being unvaccinated reviewed.  Questions answered  • Smoking status- NON SMOKER  • Follow up PCP/Specialist PMHx and Labs     MYRIAD: Qualifies for testing. She plans to check with her insurance and will return if desires.    She understands the importance of having any ordered tests to be performed in a timely fashion.  The risks of not performing them include, but are not limited  to, advanced cancer stages, bone loss from osteoporosis and/or subsequent increase in morbidity and/or mortality.  She is encouraged to review her results online and/or contact or office if she has questions.     Follow Up:  Return in about 1 year (around 6/27/2023) for WWE.            Faith Leal,   06/27/2022    [facility]  [address] [place] [address] [address] [place] [address]  Dept: [phone]  Dept Fax: [phone]  Loc: [phone]  Loc Fax: [phone]

## 2022-06-27 ENCOUNTER — OFFICE VISIT (OUTPATIENT)
Dept: OBSTETRICS AND GYNECOLOGY | Facility: CLINIC | Age: 42
End: 2022-06-27

## 2022-06-27 VITALS
SYSTOLIC BLOOD PRESSURE: 120 MMHG | HEIGHT: 69 IN | WEIGHT: 202 LBS | BODY MASS INDEX: 29.92 KG/M2 | DIASTOLIC BLOOD PRESSURE: 72 MMHG

## 2022-06-27 DIAGNOSIS — R30.0 DYSURIA: ICD-10-CM

## 2022-06-27 DIAGNOSIS — Z30.09 BIRTH CONTROL COUNSELING: ICD-10-CM

## 2022-06-27 DIAGNOSIS — N76.2 ACUTE VULVITIS: ICD-10-CM

## 2022-06-27 DIAGNOSIS — R63.5 WEIGHT GAIN: ICD-10-CM

## 2022-06-27 DIAGNOSIS — R31.29 OTHER MICROSCOPIC HEMATURIA: ICD-10-CM

## 2022-06-27 DIAGNOSIS — Z01.419 ENCOUNTER FOR GYNECOLOGICAL EXAMINATION WITHOUT ABNORMAL FINDING: Primary | ICD-10-CM

## 2022-06-27 LAB
ALBUMIN SERPL-MCNC: 4.4 G/DL (ref 3.5–5.2)
ALBUMIN/GLOB SERPL: 1.3 G/DL
ALP SERPL-CCNC: 87 U/L (ref 39–117)
ALT SERPL W P-5'-P-CCNC: 21 U/L (ref 1–33)
ANION GAP SERPL CALCULATED.3IONS-SCNC: 10.4 MMOL/L (ref 5–15)
AST SERPL-CCNC: 19 U/L (ref 1–32)
BACTERIA UR QL AUTO: ABNORMAL /HPF
BILIRUB BLD-MCNC: NEGATIVE MG/DL
BILIRUB SERPL-MCNC: 0.3 MG/DL (ref 0–1.2)
BILIRUB UR QL STRIP: NEGATIVE
BUN SERPL-MCNC: 9 MG/DL (ref 6–20)
BUN/CREAT SERPL: 12 (ref 7–25)
CALCIUM SPEC-SCNC: 9.7 MG/DL (ref 8.6–10.5)
CHLORIDE SERPL-SCNC: 102 MMOL/L (ref 98–107)
CHOLEST SERPL-MCNC: 197 MG/DL (ref 0–200)
CLARITY UR: CLEAR
CO2 SERPL-SCNC: 24.6 MMOL/L (ref 22–29)
COLOR UR: YELLOW
CREAT SERPL-MCNC: 0.75 MG/DL (ref 0.57–1)
DEPRECATED RDW RBC AUTO: 40.1 FL (ref 37–54)
EGFRCR SERPLBLD CKD-EPI 2021: 104 ML/MIN/1.73
ERYTHROCYTE [DISTWIDTH] IN BLOOD BY AUTOMATED COUNT: 12 % (ref 12.3–15.4)
GLOBULIN UR ELPH-MCNC: 3.4 GM/DL
GLUCOSE SERPL-MCNC: 113 MG/DL (ref 65–99)
GLUCOSE UR STRIP-MCNC: NEGATIVE MG/DL
GLUCOSE UR STRIP-MCNC: NEGATIVE MG/DL
HBA1C MFR BLD: 5.3 % (ref 4.8–5.6)
HCT VFR BLD AUTO: 39.5 % (ref 34–46.6)
HDLC SERPL-MCNC: 38 MG/DL (ref 40–60)
HGB BLD-MCNC: 14 G/DL (ref 12–15.9)
HGB UR QL STRIP.AUTO: ABNORMAL
HYALINE CASTS UR QL AUTO: ABNORMAL /LPF
KETONES UR QL STRIP: NEGATIVE
KETONES UR QL: NEGATIVE
LDLC SERPL CALC-MCNC: 125 MG/DL (ref 0–100)
LDLC/HDLC SERPL: 3.18 {RATIO}
LEUKOCYTE EST, POC: ABNORMAL
LEUKOCYTE ESTERASE UR QL STRIP.AUTO: ABNORMAL
MCH RBC QN AUTO: 32.3 PG (ref 26.6–33)
MCHC RBC AUTO-ENTMCNC: 35.4 G/DL (ref 31.5–35.7)
MCV RBC AUTO: 91 FL (ref 79–97)
NITRITE UR QL STRIP: NEGATIVE
NITRITE UR-MCNC: NEGATIVE MG/ML
PH UR STRIP.AUTO: 5.5 [PH] (ref 5–8)
PH UR: 5 [PH] (ref 5–8)
PLATELET # BLD AUTO: 263 10*3/MM3 (ref 140–450)
PMV BLD AUTO: 9.4 FL (ref 6–12)
POTASSIUM SERPL-SCNC: 3.8 MMOL/L (ref 3.5–5.2)
PROT SERPL-MCNC: 7.8 G/DL (ref 6–8.5)
PROT UR QL STRIP: NEGATIVE
PROT UR STRIP-MCNC: NEGATIVE MG/DL
RBC # BLD AUTO: 4.34 10*6/MM3 (ref 3.77–5.28)
RBC # UR STRIP: ABNORMAL /HPF
RBC # UR STRIP: ABNORMAL /UL
REF LAB TEST METHOD: ABNORMAL
SODIUM SERPL-SCNC: 137 MMOL/L (ref 136–145)
SP GR UR STRIP: 1.02 (ref 1–1.03)
SP GR UR: 1.02 (ref 1–1.03)
SQUAMOUS #/AREA URNS HPF: ABNORMAL /HPF
T4 FREE SERPL-MCNC: 0.95 NG/DL (ref 0.93–1.7)
TRIGL SERPL-MCNC: 190 MG/DL (ref 0–150)
TSH SERPL DL<=0.05 MIU/L-ACNC: 1.84 UIU/ML (ref 0.27–4.2)
UROBILINOGEN UR QL STRIP: ABNORMAL
UROBILINOGEN UR QL: NORMAL
VLDLC SERPL-MCNC: 34 MG/DL (ref 5–40)
WBC # UR STRIP: ABNORMAL /HPF
WBC NRBC COR # BLD: 6.36 10*3/MM3 (ref 3.4–10.8)

## 2022-06-27 PROCEDURE — 83036 HEMOGLOBIN GLYCOSYLATED A1C: CPT | Performed by: OBSTETRICS & GYNECOLOGY

## 2022-06-27 PROCEDURE — 81001 URINALYSIS AUTO W/SCOPE: CPT | Performed by: OBSTETRICS & GYNECOLOGY

## 2022-06-27 PROCEDURE — 87186 SC STD MICRODIL/AGAR DIL: CPT | Performed by: OBSTETRICS & GYNECOLOGY

## 2022-06-27 PROCEDURE — 99395 PREV VISIT EST AGE 18-39: CPT | Performed by: OBSTETRICS & GYNECOLOGY

## 2022-06-27 PROCEDURE — 80061 LIPID PANEL: CPT | Performed by: OBSTETRICS & GYNECOLOGY

## 2022-06-27 PROCEDURE — 87086 URINE CULTURE/COLONY COUNT: CPT | Performed by: OBSTETRICS & GYNECOLOGY

## 2022-06-27 PROCEDURE — 85027 COMPLETE CBC AUTOMATED: CPT | Performed by: OBSTETRICS & GYNECOLOGY

## 2022-06-27 PROCEDURE — 80053 COMPREHEN METABOLIC PANEL: CPT | Performed by: OBSTETRICS & GYNECOLOGY

## 2022-06-27 PROCEDURE — 87077 CULTURE AEROBIC IDENTIFY: CPT | Performed by: OBSTETRICS & GYNECOLOGY

## 2022-06-27 PROCEDURE — 87624 HPV HI-RISK TYP POOLED RSLT: CPT | Performed by: OBSTETRICS & GYNECOLOGY

## 2022-06-27 PROCEDURE — 99213 OFFICE O/P EST LOW 20 MIN: CPT | Performed by: OBSTETRICS & GYNECOLOGY

## 2022-06-27 PROCEDURE — 84439 ASSAY OF FREE THYROXINE: CPT | Performed by: OBSTETRICS & GYNECOLOGY

## 2022-06-27 PROCEDURE — 84443 ASSAY THYROID STIM HORMONE: CPT | Performed by: OBSTETRICS & GYNECOLOGY

## 2022-06-27 PROCEDURE — G0123 SCREEN CERV/VAG THIN LAYER: HCPCS | Performed by: OBSTETRICS & GYNECOLOGY

## 2022-06-27 PROCEDURE — 81002 URINALYSIS NONAUTO W/O SCOPE: CPT | Performed by: OBSTETRICS & GYNECOLOGY

## 2022-06-27 RX ORDER — ASPIRIN 81 MG/1
81 TABLET ORAL DAILY
COMMUNITY
End: 2023-02-22

## 2022-06-27 RX ORDER — ACETAMINOPHEN AND CODEINE PHOSPHATE 120; 12 MG/5ML; MG/5ML
1 SOLUTION ORAL DAILY
COMMUNITY
End: 2022-06-27 | Stop reason: SDUPTHER

## 2022-06-27 RX ORDER — ACETAMINOPHEN AND CODEINE PHOSPHATE 120; 12 MG/5ML; MG/5ML
1 SOLUTION ORAL DAILY
Qty: 84 TABLET | Refills: 3 | Status: SHIPPED | OUTPATIENT
Start: 2022-06-27

## 2022-06-27 RX ORDER — FOLIC ACID 1 MG/1
TABLET ORAL
COMMUNITY
End: 2023-02-22

## 2022-06-28 ENCOUNTER — TELEPHONE (OUTPATIENT)
Dept: OBSTETRICS AND GYNECOLOGY | Facility: CLINIC | Age: 42
End: 2022-06-28

## 2022-06-28 NOTE — TELEPHONE ENCOUNTER
----- Message from Faith Leal DO sent at 6/28/2022 12:34 PM EDT -----  TG and LDL elevated, this is non fasting, but that should not affect levels significantly (non fasting affects mostly TG levels).    Rec wt loss to ideal BW less than 170lbs, low fat diet and exercise, and then FU w PCP in next 6mo for them to follow/manage as needed.    Thank you.

## 2022-06-30 LAB
CYTOLOGIST CVX/VAG CYTO: NORMAL
CYTOLOGY CVX/VAG DOC CYTO: NORMAL
CYTOLOGY CVX/VAG DOC THIN PREP: NORMAL
DX ICD CODE: NORMAL
HIV 1 & 2 AB SER-IMP: NORMAL
HPV I/H RISK 4 DNA CVX QL PROBE+SIG AMP: NEGATIVE
OTHER STN SPEC: NORMAL
STAT OF ADQ CVX/VAG CYTO-IMP: NORMAL

## 2022-07-01 ENCOUNTER — TELEPHONE (OUTPATIENT)
Dept: OBSTETRICS AND GYNECOLOGY | Facility: CLINIC | Age: 42
End: 2022-07-01

## 2022-07-01 LAB
BACTERIA SPEC AEROBE CULT: ABNORMAL
BACTERIA SPEC AEROBE CULT: ABNORMAL

## 2022-07-01 RX ORDER — NITROFURANTOIN 25; 75 MG/1; MG/1
100 CAPSULE ORAL 2 TIMES DAILY
Qty: 14 CAPSULE | Refills: 0 | Status: SHIPPED | OUTPATIENT
Start: 2022-07-01 | End: 2023-02-22

## 2022-07-01 NOTE — TELEPHONE ENCOUNTER
----- Message from Faith Leal DO sent at 7/1/2022 11:57 AM EDT -----  UTI  Rx macrobid sent to pharmacy BID for 7 days

## 2022-09-02 ENCOUNTER — HOSPITAL ENCOUNTER (OUTPATIENT)
Dept: MAMMOGRAPHY | Facility: HOSPITAL | Age: 42
Discharge: HOME | End: 2022-09-02
Admitting: OBSTETRICS & GYNECOLOGY

## 2022-09-02 PROCEDURE — 77067 SCR MAMMO BI INCL CAD: CPT

## 2022-09-02 PROCEDURE — 77063 BREAST TOMOSYNTHESIS BI: CPT

## 2023-02-22 ENCOUNTER — OFFICE VISIT (OUTPATIENT)
Dept: FAMILY MEDICINE CLINIC | Facility: CLINIC | Age: 43
End: 2023-02-22
Payer: COMMERCIAL

## 2023-02-22 VITALS
BODY MASS INDEX: 31.19 KG/M2 | OXYGEN SATURATION: 99 % | DIASTOLIC BLOOD PRESSURE: 70 MMHG | TEMPERATURE: 97.7 F | HEIGHT: 69 IN | WEIGHT: 210.6 LBS | HEART RATE: 57 BPM | SYSTOLIC BLOOD PRESSURE: 104 MMHG

## 2023-02-22 DIAGNOSIS — D68.51 FACTOR 5 LEIDEN MUTATION, HETEROZYGOUS: ICD-10-CM

## 2023-02-22 DIAGNOSIS — Z13.220 SCREENING FOR LIPID DISORDERS: ICD-10-CM

## 2023-02-22 DIAGNOSIS — Z76.89 ENCOUNTER TO ESTABLISH CARE: Primary | ICD-10-CM

## 2023-02-22 DIAGNOSIS — K21.9 GASTROESOPHAGEAL REFLUX DISEASE, UNSPECIFIED WHETHER ESOPHAGITIS PRESENT: ICD-10-CM

## 2023-02-22 PROBLEM — R51.9 HEADACHE DISORDER: Status: ACTIVE | Noted: 2018-06-12

## 2023-02-22 LAB
ALBUMIN SERPL-MCNC: 4.6 G/DL (ref 3.5–5.2)
ALBUMIN/GLOB SERPL: 1.5 G/DL
ALP SERPL-CCNC: 98 U/L (ref 39–117)
ALT SERPL W P-5'-P-CCNC: 22 U/L (ref 1–33)
ANION GAP SERPL CALCULATED.3IONS-SCNC: 7 MMOL/L (ref 5–15)
AST SERPL-CCNC: 18 U/L (ref 1–32)
BASOPHILS # BLD AUTO: 0.05 10*3/MM3 (ref 0–0.2)
BASOPHILS NFR BLD AUTO: 0.6 % (ref 0–1.5)
BILIRUB SERPL-MCNC: 0.4 MG/DL (ref 0–1.2)
BUN SERPL-MCNC: 12 MG/DL (ref 6–20)
BUN/CREAT SERPL: 16.2 (ref 7–25)
CALCIUM SPEC-SCNC: 10.1 MG/DL (ref 8.6–10.5)
CHLORIDE SERPL-SCNC: 104 MMOL/L (ref 98–107)
CHOLEST SERPL-MCNC: 209 MG/DL (ref 0–200)
CO2 SERPL-SCNC: 27 MMOL/L (ref 22–29)
CREAT SERPL-MCNC: 0.74 MG/DL (ref 0.57–1)
DEPRECATED RDW RBC AUTO: 42.6 FL (ref 37–54)
EGFRCR SERPLBLD CKD-EPI 2021: 105 ML/MIN/1.73
EOSINOPHIL # BLD AUTO: 0.27 10*3/MM3 (ref 0–0.4)
EOSINOPHIL NFR BLD AUTO: 3.1 % (ref 0.3–6.2)
ERYTHROCYTE [DISTWIDTH] IN BLOOD BY AUTOMATED COUNT: 12.6 % (ref 12.3–15.4)
GLOBULIN UR ELPH-MCNC: 3.1 GM/DL
GLUCOSE SERPL-MCNC: 106 MG/DL (ref 65–99)
HCT VFR BLD AUTO: 43.2 % (ref 34–46.6)
HDLC SERPL-MCNC: 40 MG/DL (ref 40–60)
HGB BLD-MCNC: 14.3 G/DL (ref 12–15.9)
IMM GRANULOCYTES # BLD AUTO: 0.02 10*3/MM3 (ref 0–0.05)
IMM GRANULOCYTES NFR BLD AUTO: 0.2 % (ref 0–0.5)
LDLC SERPL CALC-MCNC: 136 MG/DL (ref 0–100)
LDLC/HDLC SERPL: 3.31 {RATIO}
LYMPHOCYTES # BLD AUTO: 2.05 10*3/MM3 (ref 0.7–3.1)
LYMPHOCYTES NFR BLD AUTO: 23.9 % (ref 19.6–45.3)
MCH RBC QN AUTO: 30.7 PG (ref 26.6–33)
MCHC RBC AUTO-ENTMCNC: 33.1 G/DL (ref 31.5–35.7)
MCV RBC AUTO: 92.7 FL (ref 79–97)
MONOCYTES # BLD AUTO: 0.41 10*3/MM3 (ref 0.1–0.9)
MONOCYTES NFR BLD AUTO: 4.8 % (ref 5–12)
NEUTROPHILS NFR BLD AUTO: 5.78 10*3/MM3 (ref 1.7–7)
NEUTROPHILS NFR BLD AUTO: 67.4 % (ref 42.7–76)
NRBC BLD AUTO-RTO: 0 /100 WBC (ref 0–0.2)
PLATELET # BLD AUTO: 290 10*3/MM3 (ref 140–450)
PMV BLD AUTO: 9.9 FL (ref 6–12)
POTASSIUM SERPL-SCNC: 4.3 MMOL/L (ref 3.5–5.2)
PROT SERPL-MCNC: 7.7 G/DL (ref 6–8.5)
RBC # BLD AUTO: 4.66 10*6/MM3 (ref 3.77–5.28)
SODIUM SERPL-SCNC: 138 MMOL/L (ref 136–145)
TRIGL SERPL-MCNC: 183 MG/DL (ref 0–150)
TSH SERPL DL<=0.05 MIU/L-ACNC: 1.17 UIU/ML (ref 0.27–4.2)
UREA BREATH TEST QL: NEGATIVE
VLDLC SERPL-MCNC: 33 MG/DL (ref 5–40)
WBC NRBC COR # BLD: 8.58 10*3/MM3 (ref 3.4–10.8)

## 2023-02-22 PROCEDURE — 80053 COMPREHEN METABOLIC PANEL: CPT | Performed by: NURSE PRACTITIONER

## 2023-02-22 PROCEDURE — 83013 H PYLORI (C-13) BREATH: CPT | Performed by: NURSE PRACTITIONER

## 2023-02-22 PROCEDURE — 36415 COLL VENOUS BLD VENIPUNCTURE: CPT | Performed by: NURSE PRACTITIONER

## 2023-02-22 PROCEDURE — 84443 ASSAY THYROID STIM HORMONE: CPT | Performed by: NURSE PRACTITIONER

## 2023-02-22 PROCEDURE — 83036 HEMOGLOBIN GLYCOSYLATED A1C: CPT | Performed by: NURSE PRACTITIONER

## 2023-02-22 PROCEDURE — 80061 LIPID PANEL: CPT | Performed by: NURSE PRACTITIONER

## 2023-02-22 PROCEDURE — 99204 OFFICE O/P NEW MOD 45 MIN: CPT | Performed by: NURSE PRACTITIONER

## 2023-02-22 PROCEDURE — 85025 COMPLETE CBC W/AUTO DIFF WBC: CPT | Performed by: NURSE PRACTITIONER

## 2023-02-22 RX ORDER — FOLIC ACID 1 MG/1
TABLET ORAL EVERY 24 HOURS
COMMUNITY
End: 2023-02-22

## 2023-02-22 RX ORDER — OMEPRAZOLE 40 MG/1
40 CAPSULE, DELAYED RELEASE ORAL DAILY
Qty: 30 CAPSULE | Refills: 2 | Status: SHIPPED | OUTPATIENT
Start: 2023-02-22

## 2023-02-22 NOTE — PROGRESS NOTES
"Chief Complaint  burning in throat     Subjective         Sena Reyes presents to [facility]  HPI   Presents today to establish care.  Previous PCP is Dr. Pizano.  She has a history of factor V mutation.  Over the past couple months she has been experiencing reflux.  Normally she feels reflux when she is pregnant.  She feels a burning sensation in her throat.  At times it wakes up.  She has a bad taste in her mouth.  She denies abdominal pain, diarrhea, constipation.    She has chronic headaches.  She states she always has a headache has been going on as long as she can remember.  Headaches are worse in the evening or around her period time.  She was evaluated by neurologist in 2018.    Social History     Socioeconomic History   • Marital status:      Spouse name: Gaudencio   • Number of children: 5   Tobacco Use   • Smoking status: Never   • Smokeless tobacco: Never   Vaping Use   • Vaping Use: Never used   Substance and Sexual Activity   • Alcohol use: Never   • Drug use: Never   • Sexual activity: Yes     Partners: Male     Birth control/protection: Pill        Objective     Vitals:    02/22/23 0857   BP: 104/70   Pulse: 57   Temp: 97.7 °F (36.5 °C)   SpO2: 99%   Weight: 95.5 kg (210 lb 9.6 oz)   Height: 175.3 cm (69\")        Body mass index is 31.1 kg/m².    Wt Readings from Last 3 Encounters:   02/22/23 95.5 kg (210 lb 9.6 oz)   06/27/22 91.6 kg (202 lb)   06/16/21 83.5 kg (184 lb)       BP Readings from Last 3 Encounters:   02/22/23 104/70   06/27/22 120/72   06/17/21 111/96         Physical Exam  Vitals reviewed.   Constitutional:       Appearance: Normal appearance. She is well-developed.   HENT:      Head: Normocephalic and atraumatic.      Right Ear: External ear normal.      Left Ear: External ear normal.      Mouth/Throat:      Pharynx: No oropharyngeal exudate.   Eyes:      Conjunctiva/sclera: Conjunctivae normal.      Pupils: Pupils are equal, round, and " reactive to light.   Cardiovascular:      Rate and Rhythm: Normal rate and regular rhythm.      Heart sounds: No murmur heard.    No friction rub. No gallop.   Pulmonary:      Effort: Pulmonary effort is normal.      Breath sounds: Normal breath sounds. No wheezing or rhonchi.   Abdominal:      General: Bowel sounds are normal. There is no distension.      Palpations: Abdomen is soft.      Tenderness: There is no abdominal tenderness.   Skin:     General: Skin is warm and dry.   Neurological:      Mental Status: She is alert and oriented to person, place, and time.   Psychiatric:         Mood and Affect: Mood and affect normal.         Behavior: Behavior normal.         Thought Content: Thought content normal.         Judgment: Judgment normal.          Result Review :   The following data was reviewed by: AMA Pillai on 02/22/2023:  Common labs    Common Labs 6/27/22 6/27/22 6/27/22 6/27/22    1421 1421 1421 1421   Glucose   113 (A)    BUN   9    Creatinine   0.75    Sodium   137    Potassium   3.8    Chloride   102    Calcium   9.7    Albumin   4.40    Total Bilirubin   0.3    Alkaline Phosphatase   87    AST (SGOT)   19    ALT (SGPT)   21    WBC 6.36      Hemoglobin 14.0      Hematocrit 39.5      Platelets 263      Total Cholesterol    197   Triglycerides    190 (A)   HDL Cholesterol    38 (A)   LDL Cholesterol     125 (A)   Hemoglobin A1C  5.30     (A) Abnormal value              Procedures    Assessment and Plan   Diagnoses and all orders for this visit:    1. Encounter to establish care (Primary)  -     TSH    2. Gastroesophageal reflux disease, unspecified whether esophagitis present  -     CBC & Differential  -     Comprehensive Metabolic Panel  -     TSH  -     H. Pylori Breath Test - Breath, Lung  -     omeprazole (priLOSEC) 40 MG capsule; Take 1 capsule by mouth Daily.  Dispense: 30 capsule; Refill: 2    3. Factor 5 Leiden mutation, heterozygous (HCC)    4. Screening for lipid disorders  -      Lipid Panel      Check H. pylori breath test today for reflux.  We will start her on omeprazole 40 mg daily.  Check following labs including CBC CMP TSH and lipid.    BMI is >= 30 and <35. (Class 1 Obesity). The following options were offered after discussion;: exercise counseling/recommendations and nutrition counseling/recommendations        Follow Up   Return in about 4 weeks (around 3/22/2023), or if symptoms worsen or fail to improve, for Next scheduled follow up.  Patient was given instructions and counseling regarding her condition or for health maintenance advice. Please see specific information pulled into the AVS if appropriate.     Please note that portions of this note were completed with a voice recognition program.

## 2023-02-23 DIAGNOSIS — R73.9 ELEVATED BLOOD SUGAR: Primary | ICD-10-CM

## 2023-02-23 LAB — HBA1C MFR BLD: 5.5 % (ref 4.8–5.6)

## 2023-03-13 ENCOUNTER — TELEPHONE (OUTPATIENT)
Dept: FAMILY MEDICINE CLINIC | Facility: CLINIC | Age: 43
End: 2023-03-13

## 2023-03-13 NOTE — TELEPHONE ENCOUNTER
Caller: Eric Sena    Relationship: Self    Best call back number: [phone]    What medication are you requesting: MIGRAINE MEDICATION REQUEST    What are your current symptoms: MIGRAINE    How long have you been experiencing symptoms: WORSENING DURING THE EVENING    Have you had these symptoms before:    [x] Yes  [] No    Have you been treated for these symptoms before:   [x] Yes  [] No    If a prescription is needed, what is your preferred pharmacy and phone number: [facility] [id number] - [place] - [address] AT [facility] [address] - [phone] [facility] [phone] FX

## 2023-03-14 NOTE — TELEPHONE ENCOUNTER
Spoke with patient. She says she has ongoing migraines but has only ever used OTC medications. She would like to know if she can be prescribed something for her migraines. Please advise.

## 2023-03-16 DIAGNOSIS — G43.009 MIGRAINE WITHOUT AURA AND WITHOUT STATUS MIGRAINOSUS, NOT INTRACTABLE: Primary | ICD-10-CM

## 2023-03-16 RX ORDER — RIZATRIPTAN BENZOATE 10 MG/1
10 TABLET, ORALLY DISINTEGRATING ORAL ONCE AS NEEDED
Qty: 9 TABLET | Refills: 2 | Status: SHIPPED | OUTPATIENT
Start: 2023-03-16

## 2023-03-16 NOTE — TELEPHONE ENCOUNTER
Spoke with patient and let her know about the Maxalt medication and to call us if it is not helping. Patient verbalized understanding.

## 2023-03-31 ENCOUNTER — OFFICE VISIT (OUTPATIENT)
Dept: FAMILY MEDICINE CLINIC | Facility: CLINIC | Age: 43
End: 2023-03-31
Payer: COMMERCIAL

## 2023-03-31 VITALS
HEIGHT: 69 IN | TEMPERATURE: 97.4 F | HEART RATE: 67 BPM | WEIGHT: 217.8 LBS | DIASTOLIC BLOOD PRESSURE: 68 MMHG | OXYGEN SATURATION: 99 % | BODY MASS INDEX: 32.26 KG/M2 | SYSTOLIC BLOOD PRESSURE: 104 MMHG

## 2023-03-31 DIAGNOSIS — G43.009 MIGRAINE WITHOUT AURA AND WITHOUT STATUS MIGRAINOSUS, NOT INTRACTABLE: Primary | ICD-10-CM

## 2023-03-31 PROCEDURE — 99213 OFFICE O/P EST LOW 20 MIN: CPT | Performed by: NURSE PRACTITIONER

## 2023-03-31 RX ORDER — TOPIRAMATE 25 MG/1
25 TABLET ORAL DAILY
Qty: 60 TABLET | Refills: 2 | Status: SHIPPED | OUTPATIENT
Start: 2023-03-31

## 2023-03-31 NOTE — PROGRESS NOTES
"Chief Complaint  migraines     Subjective         Sena Reyes presents to [facility]  HPI   Resents today for 1 month follow-up on migraines.  She was started on Maxalt 10 mg daily as needed for her migraines.  She reports it helps with the migraines but it does not eliminate or resolve the migraine.  She continues to have migraines daily.  Also takes Tylenol ibuprofen as needed.    Social History     Socioeconomic History   • Marital status:      Spouse name: Gaudencio   • Number of children: 5   Tobacco Use   • Smoking status: Never   • Smokeless tobacco: Never   Vaping Use   • Vaping Use: Never used   Substance and Sexual Activity   • Alcohol use: Never   • Drug use: Never   • Sexual activity: Yes     Partners: Male     Birth control/protection: Pill        Objective     Vitals:    03/31/23 1046   BP: 104/68   Pulse: 67   Temp: 97.4 °F (36.3 °C)   SpO2: 99%   Weight: 98.8 kg (217 lb 12.8 oz)   Height: 175.3 cm (69\")        Body mass index is 32.16 kg/m².    Wt Readings from Last 3 Encounters:   03/31/23 98.8 kg (217 lb 12.8 oz)   02/22/23 95.5 kg (210 lb 9.6 oz)   06/27/22 91.6 kg (202 lb)       BP Readings from Last 3 Encounters:   03/31/23 104/68   02/22/23 104/70   06/27/22 120/72         Physical Exam  Vitals reviewed.   Constitutional:       Appearance: Normal appearance. She is well-developed.   HENT:      Head: Normocephalic and atraumatic.      Right Ear: External ear normal.      Left Ear: External ear normal.      Mouth/Throat:      Pharynx: No oropharyngeal exudate.   Eyes:      Conjunctiva/sclera: Conjunctivae normal.      Pupils: Pupils are equal, round, and reactive to light.   Cardiovascular:      Rate and Rhythm: Normal rate and regular rhythm.      Heart sounds: No murmur heard.    No friction rub. No gallop.   Pulmonary:      Effort: Pulmonary effort is normal.      Breath sounds: Normal breath sounds. No wheezing or rhonchi.   Skin:     General: Skin is " warm and dry.   Neurological:      Mental Status: She is alert and oriented to person, place, and time.   Psychiatric:         Mood and Affect: Mood and affect normal.         Behavior: Behavior normal.         Thought Content: Thought content normal.         Judgment: Judgment normal.          Result Review :   The following data was reviewed by: AMA Pillai on 03/31/2023:      Procedures    Assessment and Plan   Diagnoses and all orders for this visit:    1. Migraine without aura and without status migrainosus, not intractable (Primary)    Other orders  -     topiramate (TOPAMAX) 25 MG tablet; Take 1 tablet by mouth Daily. May increase the dose to 50 daily after 1 week.  Dispense: 60 tablet; Refill: 2      Continue taking Maxalt as prescribed.  We will start topiramate 25 mg daily.  May increase to 50 mg daily after 1 week.          Follow Up   Return in about 4 weeks (around 4/28/2023), or if symptoms worsen or fail to improve.  Patient was given instructions and counseling regarding her condition or for health maintenance advice. Please see specific information pulled into the AVS if appropriate.     Please note that portions of this note were completed with a voice recognition program.Answers for HPI/ROS submitted by the patient on 3/29/2023  Please describe your symptoms.: Constant Headache that occasionally moves to amigraine  Have you had these symptoms before?: Yes  How long have you been having these symptoms?: Greater than 2 weeks  Please list any medications you are currently taking for this condition.: Rizatriptan  What is the primary reason for your visit?: Other

## 2023-05-12 ENCOUNTER — TELEPHONE (OUTPATIENT)
Dept: FAMILY MEDICINE CLINIC | Facility: CLINIC | Age: 43
End: 2023-05-12

## 2023-05-12 ENCOUNTER — OFFICE VISIT (OUTPATIENT)
Dept: FAMILY MEDICINE CLINIC | Facility: CLINIC | Age: 43
End: 2023-05-12
Payer: COMMERCIAL

## 2023-05-12 VITALS
HEART RATE: 57 BPM | HEIGHT: 69 IN | SYSTOLIC BLOOD PRESSURE: 98 MMHG | DIASTOLIC BLOOD PRESSURE: 76 MMHG | TEMPERATURE: 98.4 F | WEIGHT: 211 LBS | OXYGEN SATURATION: 100 % | BODY MASS INDEX: 31.25 KG/M2

## 2023-05-12 DIAGNOSIS — G43.009 MIGRAINE WITHOUT AURA AND WITHOUT STATUS MIGRAINOSUS, NOT INTRACTABLE: Primary | ICD-10-CM

## 2023-05-12 DIAGNOSIS — K21.9 GASTROESOPHAGEAL REFLUX DISEASE, UNSPECIFIED WHETHER ESOPHAGITIS PRESENT: ICD-10-CM

## 2023-05-12 PROCEDURE — 99213 OFFICE O/P EST LOW 20 MIN: CPT | Performed by: NURSE PRACTITIONER

## 2023-05-12 RX ORDER — RIMEGEPANT SULFATE 75 MG/75MG
75 TABLET, ORALLY DISINTEGRATING ORAL DAILY PRN
Qty: 16 TABLET | Refills: 2 | Status: SHIPPED | OUTPATIENT
Start: 2023-05-12

## 2023-05-12 RX ORDER — TOPIRAMATE 25 MG/1
75 TABLET ORAL DAILY
Qty: 90 TABLET | Refills: 3 | Status: SHIPPED | OUTPATIENT
Start: 2023-05-12

## 2023-05-12 RX ORDER — RIMEGEPANT SULFATE 75 MG/75MG
75 TABLET, ORALLY DISINTEGRATING ORAL DAILY PRN
Qty: 4 TABLET | Refills: 0 | COMMUNITY
Start: 2023-05-12

## 2023-05-12 NOTE — TELEPHONE ENCOUNTER
Misti with [facility] is stating there are extra instruction on prescription that doesn't make sense. It's for the topiramate (TOPAMAX) 25 MG tablet [[id number]]    Please call and advise  [phone]

## 2023-05-12 NOTE — PROGRESS NOTES
"Answers for HPI/ROS submitted by the patient on 5/8/2023  Please describe your symptoms.: headache  Have you had these symptoms before?: Yes  How long have you been having these symptoms?: Greater than 2 weeks  Please list any medications you are currently taking for this condition.: topiramate and rizatriptan  What is the primary reason for your visit?: Other    Chief Complaint  Migraine and Heartburn (GERD)    Subjective         Sena Reyes presents to [facility]  HPI   Presents today for follow-up on migraine and acid reflux.  Since increasing topiramate to 50 mg daily she has noted her intense migraines have improved.  The intense migraines are mostly occurring around her menstrual cycle.  The lower level chronic migraines are there most days.  The rizatriptan has alleviate the intensity of the migraine.  She generally has to take 2 tablets.  Reflux is fairly controlled.    Social History     Socioeconomic History   • Marital status:      Spouse name: Gaudencio   • Number of children: 5   Tobacco Use   • Smoking status: Never   • Smokeless tobacco: Never   Vaping Use   • Vaping Use: Never used   Substance and Sexual Activity   • Alcohol use: Never   • Drug use: Never   • Sexual activity: Yes     Partners: Male     Birth control/protection: Pill        Objective     Vitals:    05/12/23 1018   BP: 98/76   BP Location: Left arm   Patient Position: Sitting   Cuff Size: Adult   Pulse: 57   Temp: 98.4 °F (36.9 °C)   TempSrc: Oral   SpO2: 100%   Weight: 95.7 kg (211 lb)   Height: 175.3 cm (69\")        Body mass index is 31.16 kg/m².    Wt Readings from Last 3 Encounters:   05/12/23 95.7 kg (211 lb)   03/31/23 98.8 kg (217 lb 12.8 oz)   02/22/23 95.5 kg (210 lb 9.6 oz)       BP Readings from Last 3 Encounters:   05/12/23 98/76   03/31/23 104/68   02/22/23 104/70         Physical Exam  Vitals reviewed.   Constitutional:       Appearance: Normal appearance. She is well-developed. "   HENT:      Head: Normocephalic and atraumatic.      Right Ear: External ear normal.      Left Ear: External ear normal.      Mouth/Throat:      Pharynx: No oropharyngeal exudate.   Eyes:      Conjunctiva/sclera: Conjunctivae normal.      Pupils: Pupils are equal, round, and reactive to light.   Cardiovascular:      Rate and Rhythm: Normal rate and regular rhythm.      Heart sounds: No murmur heard.    No friction rub. No gallop.   Pulmonary:      Effort: Pulmonary effort is normal.      Breath sounds: Normal breath sounds. No wheezing or rhonchi.   Skin:     General: Skin is warm and dry.   Neurological:      Mental Status: She is alert and oriented to person, place, and time.   Psychiatric:         Mood and Affect: Mood and affect normal.         Behavior: Behavior normal.         Thought Content: Thought content normal.         Judgment: Judgment normal.          Result Review :   The following data was reviewed by: AMA Pillai on 05/12/2023:      Procedures    Assessment and Plan   Diagnoses and all orders for this visit:    1. Migraine without aura and without status migrainosus, not intractable (Primary)  -     Ambulatory Referral to Neurology  -     topiramate (TOPAMAX) 25 MG tablet; Take 3 tablets by mouth Daily. May increase the dose to 50 daily after 1 week.  Dispense: 90 tablet; Refill: 3  -     Rimegepant Sulfate (Nurtec) 75 MG tablet dispersible tablet; Take 1 tablet by mouth Daily As Needed (migraines).  Dispense: 16 tablet; Refill: 2  -     Rimegepant Sulfate (Nurtec) 75 MG tablet dispersible tablet; Take 1 tablet by mouth Daily As Needed (migraines).  Dispense: 4 tablet; Refill: 0    2. Gastroesophageal reflux disease, unspecified whether esophagitis present    Will consult neurology for further evaluation for migraines.  Increase the Topamax to 75 mg daily.  If she does not tolerate medication return back to the 50 mg dose.  Continue taking rizatriptan as needed.  Provided Nurtec samples  today in office.  We will order Nurtec to decrease the frequency of her headaches.          Follow Up   Return in about 3 months (around 8/12/2023), or if symptoms worsen or fail to improve, for Next scheduled follow up.  Patient was given instructions and counseling regarding her condition or for health maintenance advice. Please see specific information pulled into the AVS if appropriate.     Please note that portions of this note were completed with a voice recognition program.

## 2023-05-15 ENCOUNTER — TELEPHONE (OUTPATIENT)
Dept: FAMILY MEDICINE CLINIC | Facility: CLINIC | Age: 43
End: 2023-05-15

## 2023-05-15 ENCOUNTER — TELEPHONE (OUTPATIENT)
Dept: OBSTETRICS AND GYNECOLOGY | Facility: CLINIC | Age: 43
End: 2023-05-15
Payer: COMMERCIAL

## 2023-05-15 DIAGNOSIS — B37.9 YEAST INFECTION: Primary | ICD-10-CM

## 2023-05-15 RX ORDER — FLUCONAZOLE 150 MG/1
150 TABLET ORAL DAILY
Qty: 2 TABLET | Refills: 1 | Status: SHIPPED | OUTPATIENT
Start: 2023-05-15

## 2023-05-15 NOTE — TELEPHONE ENCOUNTER
Patient called this am.  She talked to someone @ [facility].  I have attached her note. Last seen 6/27/22.  Next appointment 6/30/23

## 2023-05-15 NOTE — TELEPHONE ENCOUNTER
Caller: Sena Reyes    Relationship: Self    Best call back number:[phone] (Mobile)    What medications are you currently taking:   Current Outpatient Medications on File Prior to Visit   Medication Sig Dispense Refill   • cetirizine (zyrTEC) 10 MG tablet Take 1 tablet by mouth Daily.     • norethindrone (MICRONOR) 0.35 MG tablet Take 1 tablet by mouth Daily. 84 tablet 3   • omeprazole (priLOSEC) 40 MG capsule Take 1 capsule by mouth Daily. 30 capsule 2   • Rimegepant Sulfate (Nurtec) 75 MG tablet dispersible tablet Take 1 tablet by mouth Daily As Needed (migraines). 16 tablet 2   • Rimegepant Sulfate (Nurtec) 75 MG tablet dispersible tablet Take 1 tablet by mouth Daily As Needed (migraines). 4 tablet 0   • rizatriptan MLT (Maxalt-MLT) 10 MG disintegrating tablet Place 1 tablet on the tongue 1 (One) Time As Needed for Migraine for up to 1 dose. May repeat in 2 hours if needed 9 tablet 2   • topiramate (TOPAMAX) 25 MG tablet Take 3 tablets by mouth Daily. May increase the dose to 50 daily after 1 week. 90 tablet 3     No current facility-administered medications on file prior to visit.        Which medication are you concerned about: topiramate (TOPAMAX) 25 MG tablet    Who prescribed you this medication: AMA RAMIREZ     What are your concerns: FREQUENT YEAST INFECTIONS    How long have you had these concerns: SINCE UPPING THE DOSAGE TO 50MG

## 2023-05-15 NOTE — TELEPHONE ENCOUNTER
Provider: DR BROWN   Caller: FARHAN LINDSEY  Relationship to Patient: SELF  Pharmacy:  [facility]  Phone Number: [phone]  Reason for Call: PT STARTED TAKING TOPAMAX FOR MIGRAINES PRESCRIBED BY HER PCP AROUND 3/31 - SINCE ABOUT 5/4/23 SHE HAS HAD YEAST INFECTION SYMPTOMS, IRRITATION, REDNESS, SOME DISCHARGE, AND INFLAMMATION. SHE TOOK A MONISTAT AT HOME TREATMENT AND SAW NO IMPROVEMENT, SHE THEN WENT TO URGENT CARE AND HAD A PELVIC EXAM, THEY SAID SHE DIDN'T LOOK LIKE SHE HAD A TYPICAL YEAST INFECTION BUT COULD SEE THE INFLAMMATION, SHE WAS PRESCRIBED DIFLUCAN AND METRONIZADOLE, SHE STILL HAS NO IMPROVEMENT.  SHE HAS LOOKED UP THE TOPAMAX MEDICATION AND READ THAT IT WAS ASSOCIATED WITH ONGOING YEAST INFECTIONS.  SHE QUIT TAKING TOPAMAX ON Friday AND WOULD LIKE TO TRY ANOTHER DOSE OF DIFLUCAN.  SHE WILL NOT BE TAKING THE TOPAMAX ANY LONGER.  PT WOULD LIKE TO KNOW IF SHE NEEDS TO BE SEEN OR IF SOMEONE CAN CALL HER TO ADVISE ON WHAT TO DO.    When was the patient last seen: 6/27/22

## 2023-05-25 DIAGNOSIS — K21.9 GASTROESOPHAGEAL REFLUX DISEASE, UNSPECIFIED WHETHER ESOPHAGITIS PRESENT: ICD-10-CM

## 2023-05-25 RX ORDER — OMEPRAZOLE 40 MG/1
CAPSULE, DELAYED RELEASE ORAL
Qty: 30 CAPSULE | Refills: 2 | Status: SHIPPED | OUTPATIENT
Start: 2023-05-25

## 2023-05-30 DIAGNOSIS — Z30.09 BIRTH CONTROL COUNSELING: ICD-10-CM

## 2023-05-30 RX ORDER — NORETHINDRONE 0.35 MG/1
TABLET ORAL
Qty: 84 TABLET | Refills: 0 | Status: SHIPPED | OUTPATIENT
Start: 2023-05-30

## 2023-05-30 NOTE — TELEPHONE ENCOUNTER
[facility] refill on [facility];pedro (Micronor) x 1 #[phone].  Last seen 6/27/22.  Next appointment 6/30/23

## 2023-08-18 ENCOUNTER — TELEPHONE (OUTPATIENT)
Dept: FAMILY MEDICINE CLINIC | Facility: CLINIC | Age: 43
End: 2023-08-18

## 2023-08-18 ENCOUNTER — OFFICE VISIT (OUTPATIENT)
Dept: FAMILY MEDICINE CLINIC | Facility: CLINIC | Age: 43
End: 2023-08-18
Payer: COMMERCIAL

## 2023-08-18 VITALS
TEMPERATURE: 98.3 F | BODY MASS INDEX: 31.49 KG/M2 | HEIGHT: 69 IN | HEART RATE: 59 BPM | DIASTOLIC BLOOD PRESSURE: 74 MMHG | WEIGHT: 212.6 LBS | SYSTOLIC BLOOD PRESSURE: 108 MMHG | OXYGEN SATURATION: 99 %

## 2023-08-18 DIAGNOSIS — G43.009 MIGRAINE WITHOUT AURA AND WITHOUT STATUS MIGRAINOSUS, NOT INTRACTABLE: ICD-10-CM

## 2023-08-18 PROCEDURE — 99213 OFFICE O/P EST LOW 20 MIN: CPT | Performed by: NURSE PRACTITIONER

## 2023-08-18 RX ORDER — RIMEGEPANT SULFATE 75 MG/75MG
75 TABLET, ORALLY DISINTEGRATING ORAL DAILY PRN
Qty: 16 TABLET | Refills: 2 | Status: SHIPPED | OUTPATIENT
Start: 2023-08-18

## 2023-08-18 RX ORDER — RIZATRIPTAN BENZOATE 10 MG/1
10 TABLET, ORALLY DISINTEGRATING ORAL ONCE AS NEEDED
Qty: 27 TABLET | Refills: 3 | Status: SHIPPED | OUTPATIENT
Start: 2023-08-18

## 2023-08-18 NOTE — TELEPHONE ENCOUNTER
Spoke with patient and let her know that her nurtec was not sent to home from [facility] pharmacy due to them having her old [employer] insurance and not her new [employer]. I provided patient with the pharmacy number to call to give them her insurance information. I also asked her to upload her insurance card to [email or web address] so we can put that information in her chart. Pt verbalized understanding.

## 2023-08-18 NOTE — PROGRESS NOTES
Chief Complaint  Migraine (Pt stated stopped topiramate, side effects of tingling in hands and feet and spotting) and Gastroesophageal reflux disease    Subjective         Sena Reyes presents to [facility]  Neurologic Problem  The patient's pertinent negatives include no altered mental status, clumsiness, focal sensory loss, focal weakness, loss of balance, memory loss, near-syncope, slurred speech, syncope, visual change or weakness. This is a chronic problem. The current episode started more than 1 year ago. The neurological problem developed gradually. The problem has been gradually worsening since onset. There was facial focality noted. Associated symptoms include headaches, neck pain and vertigo. Pertinent negatives include no abdominal pain, auditory change, aura, back pain, bladder incontinence, bowel incontinence, chest pain, confusion, diaphoresis, dizziness, fatigue, fever, light-headedness, nausea, palpitations, shortness of breath or vomiting. Past treatments include acetaminophen, aspirin, bed rest, drinking, medication, neck support, position change and sleep. The treatment provided moderate relief.    Presents today for 3-month follow-up on migraine headaches.  Patient reports that the sample Nurtec did help alleviate 2 of her migraines.  Medication was sent to the [facility] specialty pharmacy.  They never called her deliver the medication.  Patient uses Maxalt as needed for her migraines.  Has not had a debilitating migraine for 50 days.  But she continued reports having a daily dull headache.  She uses Excedrin occasionally about 2-3 times a month.  Was previously on Topamax but was having side effects from the medication which medication was stopped.    Social History     Socioeconomic History    Marital status:      Spouse name: Gaudencio    Number of children: 5   Tobacco Use    Smoking status: Never    Smokeless tobacco: Never   Vaping Use    Vaping  "Use: Never used   Substance and Sexual Activity    Alcohol use: Never    Drug use: Never    Sexual activity: Yes     Partners: Male     Birth control/protection: Pill        Objective     Vitals:    08/18/23 1022   BP: 108/74   BP Location: Left arm   Patient Position: Sitting   Cuff Size: Adult   Pulse: 59   Temp: 98.3 øF (36.8 øC)   TempSrc: Oral   SpO2: 99%   Weight: 96.4 kg (212 lb 9.6 oz)   Height: 175.3 cm (69\")        Body mass index is 31.4 kg/mý.    Wt Readings from Last 3 Encounters:   08/18/23 96.4 kg (212 lb 9.6 oz)   06/30/23 96.6 kg (213 lb)   05/12/23 95.7 kg (211 lb)       BP Readings from Last 3 Encounters:   08/18/23 108/74   06/30/23 103/70   05/12/23 98/76         Physical Exam  Vitals reviewed.   Constitutional:       Appearance: Normal appearance. She is well-developed.   HENT:      Head: Normocephalic and atraumatic.      Right Ear: External ear normal.      Left Ear: External ear normal.      Mouth/Throat:      Pharynx: No oropharyngeal exudate.   Eyes:      Conjunctiva/sclera: Conjunctivae normal.      Pupils: Pupils are equal, round, and reactive to light.   Cardiovascular:      Rate and Rhythm: Normal rate and regular rhythm.      Heart sounds: No murmur heard.    No friction rub. No gallop.   Pulmonary:      Effort: Pulmonary effort is normal.      Breath sounds: Normal breath sounds. No wheezing or rhonchi.   Skin:     General: Skin is warm and dry.   Neurological:      Mental Status: She is alert and oriented to person, place, and time.   Psychiatric:         Mood and Affect: Mood and affect normal.         Behavior: Behavior normal.         Thought Content: Thought content normal.         Judgment: Judgment normal.        Result Review :   The following data was reviewed by: AMA Pillai on 08/18/2023:      Procedures    Assessment and Plan   Diagnoses and all orders for this visit:    1. Migraine without aura and without status migrainosus, not intractable  -     rizatriptan " MLT (Maxalt-MLT) 10 MG disintegrating tablet; Place 1 tablet on the tongue 1 (One) Time As Needed for Migraine. May repeat in 2 hours if needed  Dispense: 27 tablet; Refill: 3  -     Rimegepant Sulfate (Nurtec) 75 MG tablet dispersible tablet; Take 1 tablet by mouth Daily As Needed (migraines).  Dispense: 16 tablet; Refill: 2      We will call and follow-up with the Nurtec medication.  We will send medication to her local pharmacy at [facility].  She has a consultation with neurology next week.  May continue taking Maxalt as needed.             Follow Up   Return in about 3 months (around 11/18/2023), or if symptoms worsen or fail to improve, for Next scheduled follow up.  Patient was given instructions and counseling regarding her condition or for health maintenance advice. Please see specific information pulled into the AVS if appropriate.     Please note that portions of this note were completed with a voice recognition program.

## 2023-08-23 ENCOUNTER — OFFICE VISIT (OUTPATIENT)
Dept: NEUROLOGY | Facility: CLINIC | Age: 43
End: 2023-08-23
Payer: COMMERCIAL

## 2023-08-23 VITALS
HEART RATE: 57 BPM | DIASTOLIC BLOOD PRESSURE: 60 MMHG | SYSTOLIC BLOOD PRESSURE: 131 MMHG | BODY MASS INDEX: 31.46 KG/M2 | WEIGHT: 212.4 LBS | HEIGHT: 69 IN

## 2023-08-23 DIAGNOSIS — G43.009 MIGRAINE WITHOUT AURA AND WITHOUT STATUS MIGRAINOSUS, NOT INTRACTABLE: ICD-10-CM

## 2023-08-23 NOTE — PROGRESS NOTES
"Chief Complaint  Migraine    Subjective          Sena Reyes presents to [facility]  History of Present Illness  She reports that she developed headaches many years ago. Since that time, her headaches have progressively worsened.   Currently, she reports headaches that are located temporal, frontal, and vertex. She characterizes the headaches as 8/10 in severity, throbbing  in nature with associated photophobia and phonophobia. She reports headaches last 8+ hours. She reports 30 headache days per month. She denies associated aura. She denies focal numbness, weakness, speech, and vision changes.   Triggers: stress and menstrual cycle   Symptoms improved by: Dark quiet room and Sleep   She states she is sleeping well. Reports getting 8 hours of sleep per night. denies snoring. Reports refreshing sleep.   Prior prophylactic medications include: topiramate, amitriptyline, betablockers contraindicated d/t bradycardia,   She  uses abortive therapy such as: maxalt, nurtec,   Caffeine Use: 0 servings daily  Childbearing potential : oral contraceptive,   History of Kidney Stones: No  She denies a family history of cerebral aneurysm.          Objective   Vital Signs:   /60   Pulse 57   Ht 175.3 cm (69\")   Wt 96.3 kg (212 lb 6.4 oz)   BMI 31.37 kg/mý     Physical Exam  HENT:      Head: Normocephalic.   Pulmonary:      Effort: Pulmonary effort is normal.   Neurological:      Mental Status: She is alert and oriented to person, place, and time.      Sensory: Sensation is intact.      Motor: Motor function is intact.      Coordination: Coordination is intact.      Deep Tendon Reflexes: Reflexes are normal and symmetric.      Neurologic Exam     Mental Status   Oriented to person, place, and time.      Result Review :             MRI Brain 2018:   Incidental developmental venous anomaly in the superomedial right-frontoparietal junctional lobe.  No acute intracranial abnormality. "   Nonspecific one or two hyperintense T2 non-enhancing foci are noted in the white matter, which are of uncertain clinical significance, incidental.     Assessment and Plan    Diagnoses and all orders for this visit:    1. Migraine without aura and without status migrainosus, not intractable  Assessment & Plan:  Will start Emgality for preventative therapy of headache and nurtec PRN for abortive therapy.  Will order repeat MRI brain due to subtle T2 hyperintense abnormalities on previous study in 2018. First dose of Emgality given as a sample in the office to initiate treatment.         Orders:  -     Rimegepant Sulfate (Nurtec) 75 MG tablet dispersible tablet; Take 1 tablet by mouth Daily As Needed (migraines).  Dispense: 8 tablet; Refill: 5  -     MRI Brain With & Without Contrast; Future  -     Galcanezumab-gnlm solution prefilled syringe 240 mg    Other orders  -     Discontinue: galcanezumab-gnlm (Emgality) 120 MG/ML auto-injector pen; Inject 1 mL under the skin into the appropriate area as directed Every 30 (Thirty) Days.  Dispense: 3 each; Refill: 1      I spent 45 minutes caring for Sena on this date of service. This time includes time spent by me in the following activities:preparing for the visit, reviewing tests, obtaining and/or reviewing a separately obtained history, performing a medically appropriate examination and/or evaluation , counseling and educating the patient/family/caregiver, ordering medications, tests, or procedures, documenting information in the medical record, and independently interpreting results and communicating that information with the patient/family/caregiver  Follow Up   Return in about 4 months (around 12/23/2023) for Migraine f/u.  Patient was given instructions and counseling regarding her condition or for health maintenance advice. Please see specific information pulled into the AVS if appropriate.

## 2023-08-24 ENCOUNTER — PATIENT ROUNDING (BHMG ONLY) (OUTPATIENT)
Dept: NEUROLOGY | Facility: CLINIC | Age: 43
End: 2023-08-24
Payer: COMMERCIAL

## 2023-08-25 ENCOUNTER — PRIOR AUTHORIZATION (OUTPATIENT)
Dept: NEUROLOGY | Facility: CLINIC | Age: 43
End: 2023-08-25
Payer: COMMERCIAL

## 2023-08-25 DIAGNOSIS — K21.9 GASTROESOPHAGEAL REFLUX DISEASE, UNSPECIFIED WHETHER ESOPHAGITIS PRESENT: ICD-10-CM

## 2023-08-25 PROBLEM — G43.009 MIGRAINE WITHOUT AURA AND WITHOUT STATUS MIGRAINOSUS, NOT INTRACTABLE: Status: ACTIVE | Noted: 2023-08-25

## 2023-08-25 PROBLEM — R51.9 HEADACHE DISORDER: Status: RESOLVED | Noted: 2018-06-12 | Resolved: 2023-08-25

## 2023-08-25 RX ORDER — OMEPRAZOLE 40 MG/1
CAPSULE, DELAYED RELEASE ORAL
Qty: 30 CAPSULE | Refills: 2 | Status: SHIPPED | OUTPATIENT
Start: 2023-08-25

## 2023-08-25 RX ORDER — RIMEGEPANT SULFATE 75 MG/75MG
75 TABLET, ORALLY DISINTEGRATING ORAL DAILY PRN
Qty: 8 TABLET | Refills: 5 | Status: SHIPPED | OUTPATIENT
Start: 2023-08-25

## 2023-08-25 RX ORDER — GALCANEZUMAB 120 MG/ML
120 INJECTION, SOLUTION SUBCUTANEOUS
Qty: 3 EACH | Refills: 1 | Status: SHIPPED | OUTPATIENT
Start: 2023-08-25

## 2023-08-25 NOTE — ASSESSMENT & PLAN NOTE
Will start Emgality for preventative therapy of headache and nurtec PRN for abortive therapy.  Will order repeat MRI brain due to subtle T2 hyperintense abnormalities on previous study in 2018. First dose of Emgality given as a sample in the office to initiate treatment.

## 2023-08-29 NOTE — TELEPHONE ENCOUNTER
Initial request denied - appeal sent via CMM    Per Stephani at [facility] Appeals case still under review with no additional information needed at this time. Determination will be faxed to our office.

## 2023-08-30 ENCOUNTER — PRIOR AUTHORIZATION (OUTPATIENT)
Dept: NEUROLOGY | Facility: CLINIC | Age: 43
End: 2023-08-30
Payer: COMMERCIAL

## 2023-08-30 RX ORDER — FREMANEZUMAB-VFRM 225 MG/1.5ML
225 INJECTION SUBCUTANEOUS
Qty: 1 ML | Refills: 5 | Status: SHIPPED | OUTPATIENT
Start: 2023-08-30

## 2023-08-30 NOTE — TELEPHONE ENCOUNTER
Per Christa at appeals department patient must have documented failure or contraindication to both Aimovig and Ajovy. Please advise.

## 2023-08-30 NOTE — TELEPHONE ENCOUNTER
Suzette with [facility] called answering service 8/29 @ 6:12pm, in regards to pt apeal for RX, caller [phone]. [phone]

## 2023-09-22 ENCOUNTER — TELEPHONE (OUTPATIENT)
Dept: NEUROLOGY | Facility: CLINIC | Age: 43
End: 2023-09-22
Payer: COMMERCIAL

## 2023-09-22 RX ORDER — FREMANEZUMAB-VFRM 225 MG/1.5ML
225 INJECTION SUBCUTANEOUS
Qty: 1 ML | Refills: 5 | Status: SHIPPED | OUTPATIENT
Start: 2023-09-22

## 2023-09-22 NOTE — TELEPHONE ENCOUNTER
Caller: Sena Reyes    Relationship: Self    Best call back number: [phone]     What is the best time to reach you: ANY    Who are you requesting to speak with (clinical staff, provider,  specific staff member): ANDREAS    What was the call regarding: PATIENT TELEPHONED TO REQUEST RX FOR AJOVY 225MG BE SENT TO [facility] INSTEAD OF THE LOCAL PHARMACY AS IT IS CHEAPER.     PLEASE RESEND NEW/UPDATED RX TO [facility] ph-[phone] OR CALL TO ADVISE-THANK YOU

## 2023-10-03 ENCOUNTER — HOSPITAL ENCOUNTER (OUTPATIENT)
Dept: MAMMOGRAPHY | Facility: HOSPITAL | Age: 43
Discharge: HOME | End: 2023-10-03
Admitting: OBSTETRICS & GYNECOLOGY
Payer: COMMERCIAL

## 2023-10-03 DIAGNOSIS — Z01.419 WELL WOMAN EXAM: ICD-10-CM

## 2023-10-03 PROCEDURE — 77063 BREAST TOMOSYNTHESIS BI: CPT

## 2023-10-03 PROCEDURE — 77067 SCR MAMMO BI INCL CAD: CPT

## 2023-10-05 ENCOUNTER — HOSPITAL ENCOUNTER (OUTPATIENT)
Dept: MRI IMAGING | Facility: HOSPITAL | Age: 43
Discharge: HOME | End: 2023-10-05
Admitting: NURSE PRACTITIONER
Payer: COMMERCIAL

## 2023-10-05 DIAGNOSIS — G43.009 MIGRAINE WITHOUT AURA AND WITHOUT STATUS MIGRAINOSUS, NOT INTRACTABLE: ICD-10-CM

## 2023-10-05 PROCEDURE — 70553 MRI BRAIN STEM W/O & W/DYE: CPT

## 2023-10-05 PROCEDURE — 0 GADOBENATE DIMEGLUMINE 529 MG/ML SOLUTION: Performed by: NURSE PRACTITIONER

## 2023-10-05 PROCEDURE — A9577 INJ MULTIHANCE: HCPCS | Performed by: NURSE PRACTITIONER

## 2023-10-05 RX ADMIN — GADOBENATE DIMEGLUMINE 20 ML: 529 INJECTION, SOLUTION INTRAVENOUS at 09:31

## 2024-01-03 DIAGNOSIS — K21.9 GASTROESOPHAGEAL REFLUX DISEASE, UNSPECIFIED WHETHER ESOPHAGITIS PRESENT: ICD-10-CM

## 2024-01-03 RX ORDER — OMEPRAZOLE 40 MG/1
40 CAPSULE, DELAYED RELEASE ORAL DAILY
Qty: 30 CAPSULE | Refills: 2 | Status: SHIPPED | OUTPATIENT
Start: 2024-01-03

## 2024-01-10 ENCOUNTER — OFFICE VISIT (OUTPATIENT)
Dept: FAMILY MEDICINE CLINIC | Facility: CLINIC | Age: 44
End: 2024-01-10
Payer: COMMERCIAL

## 2024-01-10 VITALS
BODY MASS INDEX: 31.96 KG/M2 | HEART RATE: 67 BPM | WEIGHT: 215.8 LBS | SYSTOLIC BLOOD PRESSURE: 100 MMHG | HEIGHT: 69 IN | DIASTOLIC BLOOD PRESSURE: 62 MMHG | TEMPERATURE: 97 F | OXYGEN SATURATION: 96 %

## 2024-01-10 DIAGNOSIS — Z13.6 ENCOUNTER FOR LIPID SCREENING FOR CARDIOVASCULAR DISEASE: ICD-10-CM

## 2024-01-10 DIAGNOSIS — Z13.220 ENCOUNTER FOR LIPID SCREENING FOR CARDIOVASCULAR DISEASE: ICD-10-CM

## 2024-01-10 DIAGNOSIS — R73.9 ELEVATED BLOOD SUGAR LEVEL: Primary | ICD-10-CM

## 2024-01-10 DIAGNOSIS — K21.9 GASTROESOPHAGEAL REFLUX DISEASE, UNSPECIFIED WHETHER ESOPHAGITIS PRESENT: ICD-10-CM

## 2024-01-10 DIAGNOSIS — Z13.29 SCREENING FOR THYROID DISORDER: ICD-10-CM

## 2024-01-10 PROCEDURE — 99213 OFFICE O/P EST LOW 20 MIN: CPT

## 2024-01-10 RX ORDER — OMEPRAZOLE 40 MG/1
40 CAPSULE, DELAYED RELEASE ORAL DAILY
Qty: 30 CAPSULE | Refills: 2 | Status: SHIPPED | OUTPATIENT
Start: 2024-01-10

## 2024-01-10 NOTE — PROGRESS NOTES
Chief Complaint  Chief Complaint   Patient presents with    [facility]    Heartburn       HPI:  Sena Reyes presents to [facility]  Patient presents today to [facility].  She is a previous [facility] patient.    Patient states that she has no concerns at this time.  She is requesting a refill of her omeprazole for acid reflux.    We did discuss that she is due for annual labs the end of February.  She will come back to have those done.  Procedures     Past History:  Medical History: has a past medical history of Factor V Leiden, History of medical problems (2016), Migraine without aura, MTHFR mutation w NORMAL homocysteine, Recurrent pregnancy loss, antepartum condition or complication, and Visual impairment (Age 20).   Surgical History: has no past surgical history on file.   Family History: family history includes Breast cancer (age of onset: 60) in her paternal grandmother; Cancer in her mother; Cervical cancer (age of onset: 34) in her mother; Diabetes in her father and maternal grandfather; Hearing loss in her father; Heart disease in her father and paternal grandfather; Hyperlipidemia in her father and mother; Lymphoma (age of onset: 58) in her maternal grandmother; Miscarriages / Stillbirths in her maternal grandmother; Stroke in her father.   Social History: reports that she has never smoked. She has never used smokeless tobacco. She reports that she does not drink alcohol and does not use drugs.  Immunization History   Administered Date(s) Administered    COVID-19 (PFIZER) BIVALENT 12+YRS 11/01/2022    COVID-19 (PFIZER) Purple Cap Monovalent 04/01/2021, 04/21/2021, 12/06/2021    COVID-19 F23 (PFIZER) 12YRS+ (COMIRNATY) 11/28/2023    Flu Vaccine Split Quad 09/23/2020, 10/28/2021    Flublok 18+yrs 09/23/2020, 10/28/2021    Fluzone (or Fluarix & Flulaval for VFC) >6mos 11/01/2022    Influenza Injectable Mdck Pf Quad 11/01/2022    Tdap 08/01/2017         Allergies:  "Topiramate     Medications:  Current Outpatient Medications on File Prior to Visit   Medication Sig Dispense Refill    cetirizine (zyrTEC) 10 MG tablet Take 1 tablet by mouth Daily.      Fremanezumab-vfrm (Ajovy) 225 MG/1.5ML solution auto-injector Inject 225 mg under the skin into the appropriate area as directed Every 30 (Thirty) Days. 1 mL 5    norethindrone (MICRONOR) 0.35 MG tablet Take 1 tablet by mouth Daily. 90 tablet 4    Rimegepant Sulfate (Nurtec) 75 MG tablet dispersible tablet Take 1 tablet by mouth Daily As Needed (migraines). 8 tablet 5    [DISCONTINUED] omeprazole (priLOSEC) 40 MG capsule Take 1 capsule by mouth Daily. 30 capsule 2     No current facility-administered medications on file prior to visit.        Health Maintenance Due   Topic Date Due    ANNUAL PHYSICAL  Never done       Vital Signs:   Vitals:    01/10/24 1251   BP: 100/62   Pulse: 67   Temp: 97 °F (36.1 °C)   SpO2: 96%   Weight: 97.9 kg (215 lb 12.8 oz)   Height: 175.3 cm (69\")      Body mass index is 31.87 kg/m².     ROS:  Review of Systems       Physical Exam  Vitals and nursing note reviewed.   Constitutional:       Appearance: Normal appearance.   HENT:      Head: Normocephalic and atraumatic.      Nose: Nose normal.      Mouth/Throat:      Mouth: Mucous membranes are moist.   Eyes:      Conjunctiva/sclera: Conjunctivae normal.      Pupils: Pupils are equal, round, and reactive to light.   Cardiovascular:      Rate and Rhythm: Normal rate and regular rhythm.      Pulses: Normal pulses.      Heart sounds: Normal heart sounds.   Pulmonary:      Effort: Pulmonary effort is normal.      Breath sounds: Normal breath sounds.   Abdominal:      General: Abdomen is flat.      Palpations: Abdomen is soft.   Musculoskeletal:         General: Normal range of motion.      Cervical back: Normal range of motion and neck supple.   Skin:     General: Skin is warm and dry.   Neurological:      General: No focal deficit present.      Mental Status: " She is alert and oriented to person, place, and time. Mental status is at baseline.   Psychiatric:         Mood and Affect: Mood normal.         Behavior: Behavior normal.         Thought Content: Thought content normal.         Judgment: Judgment normal.          Result Review        Diagnoses and all orders for this visit:    1. Elevated blood sugar level (Primary)  Comments:  Will check A1c with labs that she will have drawn in February at the end of the month.  Orders:  -     Hemoglobin A1c; Future  -     Comprehensive metabolic panel; Future  -     CBC w AUTO Differential; Future    2. Gastroesophageal reflux disease, unspecified whether esophagitis present  Comments:  Have refilled patient's omeprazole 40 mg daily.  Orders:  -     omeprazole (priLOSEC) 40 MG capsule; Take 1 capsule by mouth Daily.  Dispense: 30 capsule; Refill: 2    3. Screening for thyroid disorder  Comments:  Will check with labs that she will have drawn on February the end of the month and call results.  Orders:  -     TSH Rfx On Abnormal To Free T4; Future    4. Encounter for lipid screening for cardiovascular disease  Comments:  Will check with labs that she will have done at the end of the month in February.  We will call with results.  Orders:  -     Lipid panel; Future         Follow Up   Return in about 6 months (around 7/10/2024).  Patient was given instructions and counseling regarding her condition or for health maintenance advice. Please see specific information pulled into the AVS if appropriate.       AMA Cooley

## 2024-01-16 ENCOUNTER — OFFICE VISIT (OUTPATIENT)
Dept: NEUROLOGY | Facility: CLINIC | Age: 44
End: 2024-01-16
Payer: COMMERCIAL

## 2024-01-16 VITALS
SYSTOLIC BLOOD PRESSURE: 116 MMHG | DIASTOLIC BLOOD PRESSURE: 58 MMHG | BODY MASS INDEX: 32.11 KG/M2 | HEIGHT: 69 IN | WEIGHT: 216.8 LBS | HEART RATE: 73 BPM

## 2024-01-16 DIAGNOSIS — G43.009 MIGRAINE WITHOUT AURA AND WITHOUT STATUS MIGRAINOSUS, NOT INTRACTABLE: Primary | ICD-10-CM

## 2024-01-16 RX ORDER — FREMANEZUMAB-VFRM 225 MG/1.5ML
225 INJECTION SUBCUTANEOUS
Qty: 4.5 ML | Refills: 1 | Status: SHIPPED | OUTPATIENT
Start: 2024-01-16

## 2024-01-16 RX ORDER — RIMEGEPANT SULFATE 75 MG/75MG
75 TABLET, ORALLY DISINTEGRATING ORAL DAILY PRN
Qty: 24 TABLET | Refills: 1 | Status: SHIPPED | OUTPATIENT
Start: 2024-01-16

## 2024-01-16 NOTE — PROGRESS NOTES
"Chief Complaint  Migraine    Subjective          Sena Reyes presents to [facility]  History of Present Illness  Following up for migraine.  Having about 4-5 migraine days per month.     Interval History:   She reports that she developed headaches many years ago. Since that time, her headaches have progressively worsened.   Currently, she reports headaches that are located temporal, frontal, and vertex. She characterizes the headaches as 8/10 in severity, throbbing  in nature with associated photophobia and phonophobia. She reports headaches last 8+ hours. She reports 30 headache days per month. She denies associated aura. She denies focal numbness, weakness, speech, and vision changes.   Triggers: stress and menstrual cycle   Symptoms improved by: Dark quiet room and Sleep   She states she is sleeping well. Reports getting 8 hours of sleep per night. denies snoring. Reports refreshing sleep.   Prior prophylactic medications include: topiramate, amitriptyline, betablockers contraindicated d/t bradycardia,   She  uses abortive therapy such as: maxalt, nurtec,   Caffeine Use: 0 servings daily  Childbearing potential : oral contraceptive,   History of Kidney Stones: No  She denies a family history of cerebral aneurysm.            Objective   Vital Signs:   /58   Pulse 73   Ht 175.3 cm (69\")   Wt 98.3 kg (216 lb 12.8 oz)   BMI 32.02 kg/m²     Physical Exam  HENT:      Head: Normocephalic.   Pulmonary:      Effort: Pulmonary effort is normal.   Neurological:      Mental Status: She is alert and oriented to person, place, and time.      Sensory: Sensation is intact.      Motor: Motor function is intact.      Coordination: Coordination is intact.      Deep Tendon Reflexes: Reflexes are normal and symmetric.        Neurologic Exam     Mental Status   Oriented to person, place, and time.        Result Review :            Results for orders placed or performed during the " hospital encounter of 10/05/23   MRI Brain With & Without Contrast    Narrative    PROCEDURE: MRI BRAIN W WO CONTRAST     COMPARISON: None     INDICATIONS: migraine     CONTRAST: 20ml  Multihance I.V.     TECHNIQUE: A variety of imaging planes and parameters were utilized for visualization of suspected   pathology.  Images were performed without and with gadolinium contrast.     FINDINGS:   Diffusion-weighted imaging demonstrates no acute restriction abnormality.     Midline structures of the brain are unremarkable in appearance.  Pituitary and sellar structures   and craniocervical junction are grossly unremarkable in their appearance.     The ventricles, cisterns and sulci appear within normal limits.  No acute intracranial hemorrhage   or mass effect is noted.  No suspicious extra-axial fluid collections appreciated.  Probable   developmental venous anomaly is noted medially along the right parietal lobe near the vertex and   peripherally in the left frontal lobe.  No suspicious areas of enhancement appreciated.  Gray and   white matter signal characteristics appear within normal limits.  The major intracranial flow voids   are patent.  Cerebral pontine angle structures and inner ear structures are unremarkable.  The   mastoid air cells are grossly clear.  Visualized paranasal sinuses and orbits are grossly   unremarkable in appearance       Impression       1. No acute intracranial abnormality  2. Minimal blush of contrast within the left frontal and right parietal lobes most compatible with   small incidental developmental venous anomalies.  No suspicious areas of postcontrast enhancement                  PURA SALEEM MD         Electronically Signed and Approved By: PURA SALEEM MD on 10/05/2023 at 10:41                           Assessment and Plan    Diagnoses and all orders for this visit:    1. Migraine without aura and without status migrainosus, not intractable (Primary)  Assessment &  Plan:  Continue Ajovy for preventative therapy of headache and nurtec PRN for abortive therapy.       Orders:  -     Rimegepant Sulfate (Nurtec) 75 MG tablet dispersible tablet; Take 1 tablet by mouth Daily As Needed (migraines).  Dispense: 24 tablet; Refill: 1    Other orders  -     Fremanezumab-vfrm (Ajovy) 225 MG/1.5ML solution auto-injector; Inject 225 mg under the skin into the appropriate area as directed Every 30 (Thirty) Days.  Dispense: 4.5 mL; Refill: 1        Follow Up   Return in about 6 months (around 7/16/2024) for Migraine f/u.  Patient was given instructions and counseling regarding her condition or for health maintenance advice. Please see specific information pulled into the AVS if appropriate.

## 2024-02-18 DIAGNOSIS — K21.9 GASTROESOPHAGEAL REFLUX DISEASE, UNSPECIFIED WHETHER ESOPHAGITIS PRESENT: ICD-10-CM

## 2024-02-19 RX ORDER — OMEPRAZOLE 40 MG/1
40 CAPSULE, DELAYED RELEASE ORAL DAILY
Qty: 90 CAPSULE | Refills: 3 | Status: SHIPPED | OUTPATIENT
Start: 2024-02-19

## 2024-03-11 ENCOUNTER — PRIOR AUTHORIZATION (OUTPATIENT)
Dept: NEUROLOGY | Facility: CLINIC | Age: 44
End: 2024-03-11
Payer: COMMERCIAL

## 2024-04-23 ENCOUNTER — OFFICE VISIT (OUTPATIENT)
Dept: FAMILY MEDICINE CLINIC | Facility: CLINIC | Age: 44
End: 2024-04-23
Payer: COMMERCIAL

## 2024-04-23 VITALS
HEART RATE: 60 BPM | DIASTOLIC BLOOD PRESSURE: 78 MMHG | SYSTOLIC BLOOD PRESSURE: 104 MMHG | OXYGEN SATURATION: 98 % | TEMPERATURE: 98 F | BODY MASS INDEX: 30.81 KG/M2 | HEIGHT: 69 IN | WEIGHT: 208 LBS

## 2024-04-23 DIAGNOSIS — L93.2 CUTANEOUS LUPUS ERYTHEMATOSUS: ICD-10-CM

## 2024-04-23 DIAGNOSIS — E55.9 VITAMIN D DEFICIENCY: ICD-10-CM

## 2024-04-23 DIAGNOSIS — Z13.29 SCREENING FOR THYROID DISORDER: ICD-10-CM

## 2024-04-23 DIAGNOSIS — R73.9 ELEVATED BLOOD SUGAR LEVEL: ICD-10-CM

## 2024-04-23 DIAGNOSIS — Z76.89 ENCOUNTER TO ESTABLISH CARE: Primary | ICD-10-CM

## 2024-04-23 DIAGNOSIS — Z13.6 ENCOUNTER FOR LIPID SCREENING FOR CARDIOVASCULAR DISEASE: ICD-10-CM

## 2024-04-23 DIAGNOSIS — K21.00 GASTROESOPHAGEAL REFLUX DISEASE WITH ESOPHAGITIS, UNSPECIFIED WHETHER HEMORRHAGE: ICD-10-CM

## 2024-04-23 DIAGNOSIS — Z13.220 ENCOUNTER FOR LIPID SCREENING FOR CARDIOVASCULAR DISEASE: ICD-10-CM

## 2024-04-23 DIAGNOSIS — R53.83 OTHER FATIGUE: ICD-10-CM

## 2024-04-23 LAB
25(OH)D3 SERPL-MCNC: 39.1 NG/ML (ref 30–100)
ALBUMIN SERPL-MCNC: 4.6 G/DL (ref 3.5–5.2)
ALBUMIN/GLOB SERPL: 1.3 G/DL
ALP SERPL-CCNC: 106 U/L (ref 39–117)
ALT SERPL W P-5'-P-CCNC: 24 U/L (ref 1–33)
ANION GAP SERPL CALCULATED.3IONS-SCNC: 10.7 MMOL/L (ref 5–15)
AST SERPL-CCNC: 17 U/L (ref 1–32)
BASOPHILS # BLD AUTO: 0.05 10*3/MM3 (ref 0–0.2)
BASOPHILS NFR BLD AUTO: 0.7 % (ref 0–1.5)
BILIRUB SERPL-MCNC: 0.4 MG/DL (ref 0–1.2)
BUN SERPL-MCNC: 11 MG/DL (ref 6–20)
BUN/CREAT SERPL: 12 (ref 7–25)
CALCIUM SPEC-SCNC: 10.2 MG/DL (ref 8.6–10.5)
CHLORIDE SERPL-SCNC: 104 MMOL/L (ref 98–107)
CHOLEST SERPL-MCNC: 226 MG/DL (ref 0–200)
CO2 SERPL-SCNC: 25.3 MMOL/L (ref 22–29)
CREAT SERPL-MCNC: 0.92 MG/DL (ref 0.57–1)
DEPRECATED RDW RBC AUTO: 45 FL (ref 37–54)
EGFRCR SERPLBLD CKD-EPI 2021: 80.4 ML/MIN/1.73
EOSINOPHIL # BLD AUTO: 0.12 10*3/MM3 (ref 0–0.4)
EOSINOPHIL NFR BLD AUTO: 1.7 % (ref 0.3–6.2)
ERYTHROCYTE [DISTWIDTH] IN BLOOD BY AUTOMATED COUNT: 13.2 % (ref 12.3–15.4)
GLOBULIN UR ELPH-MCNC: 3.5 GM/DL
GLUCOSE SERPL-MCNC: 97 MG/DL (ref 65–99)
HBA1C MFR BLD: 5.9 % (ref 4.8–5.6)
HCT VFR BLD AUTO: 44.7 % (ref 34–46.6)
HDLC SERPL-MCNC: 43 MG/DL (ref 40–60)
HGB BLD-MCNC: 14.8 G/DL (ref 12–15.9)
IMM GRANULOCYTES # BLD AUTO: 0.01 10*3/MM3 (ref 0–0.05)
IMM GRANULOCYTES NFR BLD AUTO: 0.1 % (ref 0–0.5)
LDLC SERPL CALC-MCNC: 155 MG/DL (ref 0–100)
LDLC/HDLC SERPL: 3.54 {RATIO}
LYMPHOCYTES # BLD AUTO: 2.06 10*3/MM3 (ref 0.7–3.1)
LYMPHOCYTES NFR BLD AUTO: 28.5 % (ref 19.6–45.3)
MCH RBC QN AUTO: 30.4 PG (ref 26.6–33)
MCHC RBC AUTO-ENTMCNC: 33.1 G/DL (ref 31.5–35.7)
MCV RBC AUTO: 91.8 FL (ref 79–97)
MONOCYTES # BLD AUTO: 0.45 10*3/MM3 (ref 0.1–0.9)
MONOCYTES NFR BLD AUTO: 6.2 % (ref 5–12)
NEUTROPHILS NFR BLD AUTO: 4.53 10*3/MM3 (ref 1.7–7)
NEUTROPHILS NFR BLD AUTO: 62.8 % (ref 42.7–76)
NRBC BLD AUTO-RTO: 0 /100 WBC (ref 0–0.2)
PLATELET # BLD AUTO: 341 10*3/MM3 (ref 140–450)
PMV BLD AUTO: 9.2 FL (ref 6–12)
POTASSIUM SERPL-SCNC: 4.7 MMOL/L (ref 3.5–5.2)
PROT SERPL-MCNC: 8.1 G/DL (ref 6–8.5)
RBC # BLD AUTO: 4.87 10*6/MM3 (ref 3.77–5.28)
SODIUM SERPL-SCNC: 140 MMOL/L (ref 136–145)
TRIGL SERPL-MCNC: 153 MG/DL (ref 0–150)
TSH SERPL DL<=0.05 MIU/L-ACNC: 1.32 UIU/ML (ref 0.27–4.2)
VLDLC SERPL-MCNC: 28 MG/DL (ref 5–40)
WBC NRBC COR # BLD AUTO: 7.22 10*3/MM3 (ref 3.4–10.8)

## 2024-04-23 PROCEDURE — 80061 LIPID PANEL: CPT

## 2024-04-23 PROCEDURE — 82306 VITAMIN D 25 HYDROXY: CPT | Performed by: STUDENT IN AN ORGANIZED HEALTH CARE EDUCATION/TRAINING PROGRAM

## 2024-04-23 PROCEDURE — 83036 HEMOGLOBIN GLYCOSYLATED A1C: CPT

## 2024-04-23 PROCEDURE — 99213 OFFICE O/P EST LOW 20 MIN: CPT | Performed by: STUDENT IN AN ORGANIZED HEALTH CARE EDUCATION/TRAINING PROGRAM

## 2024-04-23 PROCEDURE — 80050 GENERAL HEALTH PANEL: CPT

## 2024-04-23 RX ORDER — MULTIVIT-MIN/IRON/FOLIC ACID/K 18-600-40
CAPSULE ORAL
COMMUNITY
End: 2024-04-23

## 2024-04-23 RX ORDER — MAGNESIUM OXIDE 400 MG/1
400 TABLET ORAL DAILY
COMMUNITY

## 2024-04-23 RX ORDER — FAMOTIDINE 20 MG/1
20 TABLET, FILM COATED ORAL 2 TIMES DAILY PRN
Qty: 60 TABLET | Refills: 2 | Status: SHIPPED | OUTPATIENT
Start: 2024-04-23 | End: 2024-05-23

## 2024-04-23 RX ORDER — DESONIDE 0.5 MG/ML
LOTION TOPICAL
COMMUNITY
Start: 2024-02-22

## 2024-04-23 NOTE — PROGRESS NOTES
"Chief Complaint  Lupus (JAJA CECY, omeprazole causing possible lupus , flare)    Subjective      History of Present Illness    Sena Reyes is a 43 y.o. female who presents to [facility] with recent diagnosis of cutaneous lupus presented today to establish care.  Patient states that her dermatologist recently diagnosed her with cutaneous lupus and that she should discontinue omeprazole indefinitely and start a different medication for GERD.  Patient states she has had reflux symptoms intermittently she does not take omeprazole daily.        Objective   Vital Signs:   Vitals:    04/23/24 0913   BP: 104/78   Pulse: 60   Temp: 98 °F (36.7 °C)   SpO2: 98%   Weight: 94.3 kg (208 lb)   Height: 175.3 cm (69\")   PainSc: 0-No pain     Body mass index is 30.72 kg/m².    Wt Readings from Last 3 Encounters:   04/23/24 94.3 kg (208 lb)   01/16/24 98.3 kg (216 lb 12.8 oz)   01/10/24 97.9 kg (215 lb 12.8 oz)     BP Readings from Last 3 Encounters:   04/23/24 104/78   01/16/24 116/58   01/10/24 100/62       Health Maintenance   Topic Date Due    ANNUAL PHYSICAL  08/22/2024 (Originally 6/14/2021)    INFLUENZA VACCINE  08/01/2024    BMI FOLLOWUP  04/23/2025    PAP SMEAR  06/27/2025    TDAP/TD VACCINES (2 - Td or Tdap) 08/01/2027    HEPATITIS C SCREENING  Completed    COVID-19 Vaccine  Completed    Pneumococcal Vaccine 0-64  Aged Out       Physical Exam   General:  AAO x3, no acute distress.  Eyes:  EOMI, PERRLA  Ears/Nose/Mouth/Throat:  Moist mucous membranes  Respiratory:  CTA bilaterally, no wheezes rales or rhonchi  Cardiovascular:  RRR no murmur rubs or gallops  Gastrointestinal:  Abdomen soft nondistended nontender bowel sounds present x4 quadrants  Musculoskeletal:  Moves all 4 extremities spontaneously, no apparent weakness  Skin: Erythematous well-demarcated rash noted in the anterior scalp moves into the hairline warm, dry, no skin rashes or lesions  Neurologic:  AAO x3, cranial nerves 2-12 " grossly intact, no focal neuro deficits  Psychiatric:  Normal mood and affect    Result Review :     The following data was reviewed by: Evens Bell MD on 04/23/2024:      Procedures          Diagnoses and all orders for this visit:    1. Encounter to establish care (Primary)    2. Cutaneous lupus erythematosus    3. Gastroesophageal reflux disease with esophagitis, unspecified whether hemorrhage  -     famotidine (Pepcid) 20 MG tablet; Take 1 tablet by mouth 2 (Two) Times a Day As Needed for Heartburn for up to 30 days.  Dispense: 60 tablet; Refill: 2    4. Other fatigue  -     Vitamin D 25 hydroxy; Future  -     Vitamin D 25 hydroxy    5. Vitamin D deficiency  -     Vitamin D 25 hydroxy; Future  -     Vitamin D 25 hydroxy    6. Screening for thyroid disorder  Comments:  Will check with labs that she will have drawn on February the end of the month and call results.  Orders:  -     TSH Rfx On Abnormal To Free T4    7. Encounter for lipid screening for cardiovascular disease  Comments:  Will check with labs that she will have done at the end of the month in February.  We will call with results.  Orders:  -     Lipid panel    8. Elevated blood sugar level  Comments:  Will check A1c with labs that she will have drawn in February at the end of the month.  Orders:  -     Hemoglobin A1c  -     Comprehensive metabolic panel  -     CBC w AUTO Differential    Some of the comments above for written by a previous provider such as elevated sugar level.      Patient had labs that were not collected I recommended she get those collected today.  I did review labs on her phone from her dermatologist which showed a positive CARMEN.      BMI is >= 30 and <35. (Class 1 Obesity). The following options were offered after discussion;: weight loss educational material (shared in after visit summary), exercise counseling/recommendations, and nutrition counseling/recommendations         FOLLOW UP  Return in 6 months (on 10/23/2024) for  Annual physical.  Patient was given instructions and counseling regarding her condition or for health maintenance advice. Please see specific information pulled into the AVS if appropriate.       Evens Bell MD  04/23/24  14:52 EDT    CURRENT & DISCONTINUED MEDICATIONS  Current Outpatient Medications   Medication Instructions    Ajovy 225 mg, Subcutaneous, Every 30 Days    Calcium Carb-Cholecalciferol 600-12.5 MG-MCG capsule Oral    cetirizine (ZYRTEC) 10 mg, Oral, Daily    desonide (DESOWEN) 0.05 % lotion     famotidine (PEPCID) 20 mg, Oral, 2 Times Daily PRN    magnesium oxide (MAG-OX) 400 mg, Oral, Daily    norethindrone (MICRONOR) 0.35 mg, Oral, Daily    Nurtec 75 mg, Oral, Daily PRN    omeprazole (PRILOSEC) 40 mg, Oral, Daily       Medications Discontinued During This Encounter   Medication Reason    Cholecalciferol (Vitamin D) 50 MCG (2000 UT) capsule         used

## 2024-06-24 DIAGNOSIS — G43.009 MIGRAINE WITHOUT AURA AND WITHOUT STATUS MIGRAINOSUS, NOT INTRACTABLE: ICD-10-CM

## 2024-06-24 RX ORDER — RIMEGEPANT SULFATE 75 MG/75MG
75 TABLET, ORALLY DISINTEGRATING ORAL DAILY PRN
Qty: 24 TABLET | Refills: 1 | Status: SHIPPED | OUTPATIENT
Start: 2024-06-24

## 2024-06-26 ENCOUNTER — TELEPHONE (OUTPATIENT)
Dept: FAMILY MEDICINE CLINIC | Facility: CLINIC | Age: 44
End: 2024-06-26
Payer: COMMERCIAL

## 2024-06-26 NOTE — PROGRESS NOTES
Well Woman Visit    CC: Scheduled annual well gyn visit  Chief Complaint   Patient presents with    Annual Exam       HPI:   43 y.o.   Social History     Substance and Sexual Activity   Sexual Activity Yes    Partners: Male    Birth control/protection: Pill     Mammo Screening Digital Tomosynthesis Bilateral With CAD (10/03/2023 11:41)  IgP, Aptima HPV (2022 14:07) Pap negative, HPV negative  Office Visit with Faith Leal DO (2023)    FVL Heterozygote.  New Dx of cutaneous lupus  Menses:   q mo (had a couple months of k3hdpyd about a year ago), lasts 4 days, changes products q 8hrs on heaviest days (uses cup).   Pain with menses:  Mild, heat only helps    Pt has no complaints today.    PCP: does manage PMHx and preventative labs  History: PMHx, Meds, Allergies, PSHx, Social Hx, and POBHx all reviewed and updated.    PHYSICAL EXAM:  /68   Wt 93.9 kg (207 lb)   LMP 2024 (Exact Date)   BMI 30.57 kg/m²  Not found.   General- NAD, alert and oriented, appropriate  Psych- Normal mood, good memory  Neck- No masses, no thyroid enlargement  CV- Regular rhythm, no murmurs  Resp- CTA to bases, no wheezes  Abdomen- Soft, non distended, non tender, no masses    Chaperone present during breast and/or pelvic exam if performed.  Breast left-  Bilaterally symmetrical, no masses, non tender, no nipple discharge, no axillary or supraclavicular nodes palpable.    Breast right- Bilaterally symmetrical, no masses, non tender, no nipple discharge, no axillary or supraclavicular nodes palpable.      External genitalia- Normal female, no lesions  Urethra/meatus- Normal, no masses, non tender  Bladder- Normal, no masses, non tender  Vagina- Normal, no atrophy, no lesions, no discharge.    Prolapse : none noted, not examined with split speculum to delineate  Cvx- Normal, no lesions, no discharge, No cervical motion tenderness  Uterus- Normal size, shape & consistency.  Non tender, mobile.  Adnexa- No mass, non  tender  Anus/Rectum/Perineum- Normal appearance, no mass, good sphincter tone, WITH small hemorrhoids, no prolapse    Lymphatic- No palpable neck, axillary, or groin nodes  Ext- No edema, no cyanosis    Skin- No lesions, no rashes, no acanthosis nigricans      ASSESSMENT and PLAN:    Diagnoses and all orders for this visit:    1. Well woman exam (Primary)  -     Mammo Screening Digital Tomosynthesis Bilateral With CAD; Future    2. Birth control counseling  -     norethindrone (MICRONOR) 0.35 MG tablet; Take 1 tablet by mouth Daily.  Dispense: 90 tablet; Refill: 4    3. H/O factor V Leiden mutation    4. Personal history of systemic lupus erythematosus (SLE)  Comments:  cutaneous        Preventative:  BREAST HEALTH- Monthly self breast exam importance and how to reviewed. MMG and/or MRI (prn) reviewed per society guidelines and her individual history. Screen: Updated today  CERVICAL CANCER Screening- Reviewed current ASCCP guidelines for screening w and wo cotest HPV, age specific.  Screen: Already up to date  COLON CANCER Screening- Reviewed current medical society guidelines and options.  Screen:  Not medically needed  Importance of WEIGHT MANAGEMENT, nutrition, and exercise reviewed.  Ideal BMI discussed  BONE HEALTH- Reviewed current medical society guidelines and options for testing, calcium and vit D intake.  Weight bearing exercise.  DEXA: Not medically needed  VACCINATIONS Recommended: Covid vaccine, Flu annually.  Importance discussed, risk being unvaccinated reviewed.  Questions answered  Smoking status- NON SMOKER/VAPER  Follow up PCP/Specialist PMHx and/or Labs          She understands the importance of having any ordered tests to be performed in a timely fashion.  The risks of not performing them include, but are not limited to, advanced cancer stages, bone loss from osteoporosis and/or subsequent increase in morbidity and/or mortality.  She is encouraged to review her results online and/or contact or  office if she has questions.     Follow Up:  Return in about 1 year (around 7/2/2025) for WWE.            Faith Leal,   07/02/2024    [facility]  [address] [place] [address]  Dept: [phone]  Dept Fax: [phone]  Loc: [phone]  Loc Fax: [phone]

## 2024-06-26 NOTE — TELEPHONE ENCOUNTER
Caller: Willie [address] [place], [place] - [address] - [phone] PH - [phone] FX (ESTEFANI)    Relationship to patient: Pharmacy    Best call back number: [phone]    Patient is needing: WILLIE CALLED AND STATED PATIENT HAD CALLED PHARMACY REGARDING REFILL OF Rimegepant Sulfate (Nurtec) 75 MG tablet dispersible tablet .PRESCRIPTION HAS BEEN SENT TO [facility]. PHARMACY SAID TO ASK FOR ESTEFANI.      Willie [address] [place], [place] - [address] - [phone] PH - [phone]

## 2024-07-01 PROBLEM — O02.1 IUFD AT LESS THAN 20 WEEKS OF GESTATION: Status: RESOLVED | Noted: 2021-06-17 | Resolved: 2024-07-01

## 2024-07-02 ENCOUNTER — OFFICE VISIT (OUTPATIENT)
Dept: OBSTETRICS AND GYNECOLOGY | Facility: CLINIC | Age: 44
End: 2024-07-02
Payer: COMMERCIAL

## 2024-07-02 VITALS — WEIGHT: 207 LBS | SYSTOLIC BLOOD PRESSURE: 118 MMHG | DIASTOLIC BLOOD PRESSURE: 68 MMHG | BODY MASS INDEX: 30.57 KG/M2

## 2024-07-02 DIAGNOSIS — M32.9 PERSONAL HISTORY OF SYSTEMIC LUPUS ERYTHEMATOSUS (SLE): ICD-10-CM

## 2024-07-02 DIAGNOSIS — Z86.2 H/O FACTOR V LEIDEN MUTATION: ICD-10-CM

## 2024-07-02 DIAGNOSIS — Z01.419 WELL WOMAN EXAM: Primary | ICD-10-CM

## 2024-07-02 DIAGNOSIS — Z30.09 BIRTH CONTROL COUNSELING: ICD-10-CM

## 2024-07-02 RX ORDER — ACETAMINOPHEN AND CODEINE PHOSPHATE 120; 12 MG/5ML; MG/5ML
1 SOLUTION ORAL DAILY
Qty: 90 TABLET | Refills: 4 | Status: SHIPPED | OUTPATIENT
Start: 2024-07-02

## 2024-07-16 ENCOUNTER — OFFICE VISIT (OUTPATIENT)
Dept: NEUROLOGY | Facility: CLINIC | Age: 44
End: 2024-07-16
Payer: COMMERCIAL

## 2024-07-16 VITALS
BODY MASS INDEX: 30.63 KG/M2 | SYSTOLIC BLOOD PRESSURE: 118 MMHG | HEIGHT: 69 IN | HEART RATE: 72 BPM | DIASTOLIC BLOOD PRESSURE: 47 MMHG | WEIGHT: 206.8 LBS

## 2024-07-16 DIAGNOSIS — G43.009 MIGRAINE WITHOUT AURA AND WITHOUT STATUS MIGRAINOSUS, NOT INTRACTABLE: Primary | ICD-10-CM

## 2024-07-16 RX ORDER — ATOGEPANT 60 MG/1
60 TABLET ORAL DAILY
Qty: 90 TABLET | Refills: 1 | Status: SHIPPED | OUTPATIENT
Start: 2024-07-16

## 2024-07-16 NOTE — PROGRESS NOTES
"Chief Complaint  Migraine    Subjective          Sena Reyes presents to [facility]  History of Present Illness  Following up for migraine.  Having about 10 headache days per month on Ajovy.     Interval History:   She reports that she developed headaches many years ago. Since that time, her headaches have progressively worsened.   Currently, she reports headaches that are located temporal, frontal, and vertex. She characterizes the headaches as 8/10 in severity, throbbing  in nature with associated photophobia and phonophobia. She reports headaches last 8+ hours. She reports 30 headache days per month. She denies associated aura. She denies focal numbness, weakness, speech, and vision changes.   Triggers: stress and menstrual cycle   Symptoms improved by: Dark quiet room and Sleep   She states she is sleeping well. Reports getting 8 hours of sleep per night. denies snoring. Reports refreshing sleep.   Prior prophylactic medications include: topiramate, amitriptyline, betablockers contraindicated d/t bradycardia, Ajovy   She  uses abortive therapy such as: maxalt, nurtec,   Caffeine Use: 0 servings daily  Childbearing potential : oral contraceptive,   History of Kidney Stones: No  She denies a family history of cerebral aneurysm.            Objective   Vital Signs:   /47   Pulse 72   Ht 175.3 cm (69\")   Wt 93.8 kg (206 lb 12.8 oz)   BMI 30.54 kg/m²     Physical Exam  HENT:      Head: Normocephalic.   Pulmonary:      Effort: Pulmonary effort is normal.   Neurological:      Mental Status: She is alert and oriented to person, place, and time.      Sensory: Sensation is intact.      Motor: Motor function is intact.      Coordination: Coordination is intact.      Deep Tendon Reflexes: Reflexes are normal and symmetric.        Neurologic Exam     Mental Status   Oriented to person, place, and time.        Result Review :               Assessment and Plan    Diagnoses " and all orders for this visit:    1. Migraine without aura and without status migrainosus, not intractable (Primary)  Assessment & Plan:  D/c Ajovy and start qulipta for preventative therapy.  Ubrelvy PRN for abortive therapy              Other orders  -     Atogepant (Qulipta) 60 MG tablet; Take 1 tablet by mouth Daily.  Dispense: 90 tablet; Refill: 1  -     ubrogepant (Ubrelvy) 100 MG tablet; Take 1 tablet by mouth 1 (One) Time As Needed (migraine) for up to 30 doses. One tablet at HA onset, may repeat once in 2 hours if needed.  Dispense: 10 tablet; Refill: 5        Follow Up   Return in about 4 months (around 11/16/2024) for Migraine f/u.  Patient was given instructions and counseling regarding her condition or for health maintenance advice. Please see specific information pulled into the AVS if appropriate.

## 2024-07-25 ENCOUNTER — PRIOR AUTHORIZATION (OUTPATIENT)
Dept: NEUROLOGY | Facility: CLINIC | Age: 44
End: 2024-07-25
Payer: COMMERCIAL

## 2024-07-31 DIAGNOSIS — Z30.09 BIRTH CONTROL COUNSELING: ICD-10-CM

## 2024-07-31 RX ORDER — NORETHINDRONE 0.35 MG/1
1 TABLET ORAL DAILY
Qty: 28 TABLET | OUTPATIENT
Start: 2024-07-31

## 2024-10-14 ENCOUNTER — HOSPITAL ENCOUNTER (OUTPATIENT)
Dept: MAMMOGRAPHY | Facility: HOSPITAL | Age: 44
Discharge: HOME | End: 2024-10-14
Admitting: OBSTETRICS & GYNECOLOGY
Payer: COMMERCIAL

## 2024-10-14 DIAGNOSIS — Z01.419 WELL WOMAN EXAM: ICD-10-CM

## 2024-10-14 PROCEDURE — 77067 SCR MAMMO BI INCL CAD: CPT

## 2024-10-14 PROCEDURE — 77063 BREAST TOMOSYNTHESIS BI: CPT

## 2024-10-18 ENCOUNTER — PRIOR AUTHORIZATION (OUTPATIENT)
Dept: NEUROLOGY | Facility: CLINIC | Age: 44
End: 2024-10-18
Payer: COMMERCIAL

## 2024-10-21 NOTE — TELEPHONE ENCOUNTER
Approved on October 18 by Josemanuel 2017 [employer]  Request Reference Number: [id number]. UBRELVY TAB 100MG is approved through 10/18/2025. Your patient may now fill this prescription and it will be covered.  Authorization Expiration Date: 10/18/2025

## 2024-10-28 NOTE — PROGRESS NOTES
"No chief complaint on file.        Subjective   HPI  Sena Reyes is a 44 y.o. female, , LMP was on No LMP recorded. (Menstrual status: Oral contraceptives). who presents for {Birth Control :91365}.    Her periods are {gyn period regularity:715}, lasting {numbers; 0-10:61034} days.  Dysmenorrhea:{gyn dysmenorrhea:716}.  The patient's current method of contraception is {contraceptive methods:52931}.  She {is/not satisfied:46885} The patient reports additional symptoms as {gyn cyclic prob:26471}.      Partner Status: {Marital Status:46099}.  New Partners since last visit: {YES:35880}.  Desires STI Screening: {YES:93832}.    Additional OB/GYN History   Last Pap :   Last Completed Pap Smear            PAP SMEAR (Every 3 Years) Next due on 2022  IgP, Aptima HPV    05/10/2021  SCANNED - PAP SMEAR                  History of abnormal Pap smear: {yes***/no:03622}    OB History          9    Para   6    Term   5       0    AB   4    Living   5         SAB   3    IAB   0    Ectopic   0    Molar   0    Multiple   1    Live Births   5                The additional following portions of the patient's history were reviewed and updated as appropriate: {history reviewed:::\"allergies\",\"current medications\",\"past family history\",\"past medical history\",\"past social history\",\"past surgical history\",\"problem list\"}.    Review of Systems    I have reviewed and agree with the HPI, ROS, and historical information as entered above. Duran Tavera MA    Objective   There were no vitals taken for this visit.    Physical Exam    Assessment & Plan     Assessment     Problem List Items Addressed This Visit    None      {Family Planning Plan:76110}      Duran Tavera MA  10/29/2024  "

## 2024-10-29 ENCOUNTER — OFFICE VISIT (OUTPATIENT)
Dept: OBSTETRICS AND GYNECOLOGY | Facility: CLINIC | Age: 44
End: 2024-10-29
Payer: COMMERCIAL

## 2024-10-29 VITALS
DIASTOLIC BLOOD PRESSURE: 82 MMHG | OXYGEN SATURATION: 98 % | HEART RATE: 61 BPM | HEIGHT: 69 IN | RESPIRATION RATE: 19 BRPM | BODY MASS INDEX: 29.74 KG/M2 | WEIGHT: 200.8 LBS | SYSTOLIC BLOOD PRESSURE: 123 MMHG

## 2024-10-29 DIAGNOSIS — Z30.014 ENCOUNTER FOR INITIAL PRESCRIPTION OF INTRAUTERINE CONTRACEPTIVE DEVICE (IUD): Primary | ICD-10-CM

## 2024-10-29 RX ORDER — UBIDECARENONE 200 MG
1 CAPSULE ORAL DAILY
COMMUNITY
Start: 2024-09-23

## 2024-10-29 RX ORDER — HYDROXYCHLOROQUINE SULFATE 200 MG/1
200 TABLET, FILM COATED ORAL DAILY
COMMUNITY
Start: 2024-10-26

## 2024-10-29 NOTE — PROGRESS NOTES
GYN Problem/Follow Up Visit    Chief Complaint   Patient presents with    Contraception     Patient has concerns and wanted a consult regarding birth control           HPI  Sena Reyes is a 44 y.o. female, , who presents to discuss hormone free contraceptive options. Minipill in the past increased headaches, discontinued several months ago, currently using condoms for birth control.   Hx factor 5, migraines, lupus.     Menses monthly, lasting 3-4 days, mild menstrual cramps  IgP, Aptima HPV (2022 14:07)     Additional OB/GYN History   No LMP recorded. (Menstrual status: Oral contraceptives).  Current contraception: contraceptive methods: Condoms    Past Medical History:   Diagnosis Date    Cutaneous lupus erythematosus     Factor V Leiden     Heterozygote    History of medical problems     Blood Factor V Leiden    IUFD at less than 20 weeks of gestation 2021    Lupus 2024    Migraine without aura     MTHFR mutation w NORMAL homocysteine     PMS (premenstrual syndrome)     Recurrent pregnancy loss, antepartum condition or complication     Urinary tract infection     Varicella     Visual impairment Age 20    Wear glasses      History reviewed. No pertinent surgical history.   Family History   Problem Relation Age of Onset    Diabetes Father     Hearing loss Father     Heart disease Father     Hyperlipidemia Father     Stroke Father     Hypertension Father     Cervical cancer Mother 34    Hyperlipidemia Mother     Hypertension Mother     Heart disease Paternal Grandfather     Breast cancer Paternal Grandmother 60    Lymphoma Maternal Grandmother 58    Miscarriages / Stillbirths Maternal Grandmother     Diabetes Maternal Grandfather     Prostate cancer Maternal Uncle     Stroke Paternal Aunt     Colon cancer Neg Hx     Ovarian cancer Neg Hx     Uterine cancer Neg Hx     Deep vein thrombosis Neg Hx     Pulmonary embolism Neg Hx      Allergies as of 10/29/2024 - Reviewed 10/29/2024   Allergen  "Reaction Noted    Sulfa antibiotics Other (See Comments) 08/05/2024    Topiramate Other (See Comments) 08/18/2023      The additional following portions of the patient's history were reviewed and updated as appropriate: allergies, current medications, past family history, past medical history, past social history, past surgical history, and problem list.    Review of Systems    See HPI for pertinent ROS    Objective   /82   Pulse 61   Resp 19   Ht 175.3 cm (69\")   Wt 91.1 kg (200 lb 12.8 oz)   SpO2 98%   BMI 29.65 kg/m²     Physical Exam  Vitals and nursing note reviewed.   Constitutional:       Appearance: Normal appearance. She is well-developed and well-groomed.   Cardiovascular:      Rate and Rhythm: Normal rate.   Pulmonary:      Effort: Pulmonary effort is normal.   Lymphadenopathy:      Cervical: No cervical adenopathy.   Skin:     General: Skin is warm and dry.   Neurological:      Mental Status: She is alert and oriented to person, place, and time.   Psychiatric:         Mood and Affect: Affect normal.         Cognition and Memory: Cognition normal.              Assessment and Plan    Diagnoses and all orders for this visit:    1. Encounter for initial prescription of intrauterine contraceptive device (IUD) (Primary)  -     hCG, Quantitative, Pregnancy; Future        Counseling:  TRACK MENSES, RTO if <q21d, >7d long, heavy or painful.    All BIRTH CONTROL options R/B/A/SE/E of each reviewed in detail.  SAFE SEX/condoms importance reviewed.    Desires Hormone free copper IUD/paragard    Follow Up:  Return for precert for paragard IUD, schedule for insertion.    {Time Spent (Optional):19913}    Johanne Hampton, APRN  10/29/2024    "

## 2024-10-29 NOTE — PROGRESS NOTES
GYN Problem/Follow Up Visit    Chief Complaint   Patient presents with    Contraception     Patient has concerns and wanted a consult regarding birth control           HPI  Sena Reyes is a 44 y.o. female, , who presents to discuss hormone free contraceptive options. [place] in the past increased headaches, discontinued several months ago, currently using condoms for birth control.   Hx factor 5, migraines, lupus.     Menses monthly, lasting 3-4 days, mild menstrual cramps  IgP, Aptima HPV (2022 14:07)     Additional OB/GYN History   No LMP recorded. (Menstrual status: Oral contraceptives).  Current contraception: contraceptive methods: Condoms    Past Medical History:   Diagnosis Date    Cutaneous lupus erythematosus     Factor V Leiden     Heterozygote    History of medical problems     Blood Factor V Leiden    IUFD at less than 20 weeks of gestation 2021    Lupus 2024    Migraine without aura     MTHFR mutation w NORMAL homocysteine     PMS (premenstrual syndrome)     Recurrent pregnancy loss, antepartum condition or complication     Urinary tract infection     Varicella     Visual impairment Age 20    Wear glasses      History reviewed. No pertinent surgical history.   Family History   Problem Relation Age of Onset    Diabetes Father     Hearing loss Father     Heart disease Father     Hyperlipidemia Father     Stroke Father     Hypertension Father     Cervical cancer Mother 34    Hyperlipidemia Mother     Hypertension Mother     Heart disease Paternal Grandfather     Breast cancer Paternal Grandmother 60    Lymphoma Maternal Grandmother 58    Miscarriages / Stillbirths Maternal Grandmother     Diabetes Maternal Grandfather     Prostate cancer Maternal Uncle     Stroke Paternal Aunt     Colon cancer Neg Hx     Ovarian cancer Neg Hx     Uterine cancer Neg Hx     Deep vein thrombosis Neg Hx     Pulmonary embolism Neg Hx      Allergies as of 10/29/2024 - Reviewed 10/29/2024   Allergen  "Reaction Noted    Sulfa antibiotics Other (See Comments) 08/05/2024    Topiramate Other (See Comments) 08/18/2023      The additional following portions of the patient's history were reviewed and updated as appropriate: allergies, current medications, past family history, past medical history, past social history, past surgical history, and problem list.    Review of Systems    See HPI for pertinent ROS    Objective   /82   Pulse 61   Resp 19   Ht 175.3 cm (69\")   Wt 91.1 kg (200 lb 12.8 oz)   SpO2 98%   BMI 29.65 kg/m²     Physical Exam  Vitals and nursing note reviewed.   Constitutional:       Appearance: Normal appearance. She is well-developed and well-groomed.   Cardiovascular:      Rate and Rhythm: Normal rate.   Pulmonary:      Effort: Pulmonary effort is normal.   Lymphadenopathy:      Cervical: No cervical adenopathy.   Skin:     General: Skin is warm and dry.   Neurological:      Mental Status: She is alert and oriented to person, place, and time.   Psychiatric:         Mood and Affect: Affect normal.         Cognition and Memory: Cognition normal.              Assessment and Plan    Diagnoses and all orders for this visit:    1. Encounter for initial prescription of intrauterine contraceptive device (IUD) (Primary)  -     hCG, Quantitative, Pregnancy; Future        Counseling:  TRACK MENSES, RTO if <q21d, >7d long, heavy or painful.    All BIRTH CONTROL options R/B/A/SE/E of each reviewed in detail.  SAFE SEX/condoms importance reviewed.    Desires Hormone free copper IUD/paragard    Follow Up:  Return for precert for paragard IUD, schedule for insertion.        Johanne Hampton, APRN  10/29/2024    "

## 2024-10-30 ENCOUNTER — OFFICE VISIT (OUTPATIENT)
Dept: FAMILY MEDICINE CLINIC | Facility: CLINIC | Age: 44
End: 2024-10-30
Payer: COMMERCIAL

## 2024-10-30 VITALS
HEIGHT: 69 IN | WEIGHT: 199 LBS | DIASTOLIC BLOOD PRESSURE: 70 MMHG | OXYGEN SATURATION: 98 % | SYSTOLIC BLOOD PRESSURE: 100 MMHG | TEMPERATURE: 97.3 F | BODY MASS INDEX: 29.47 KG/M2

## 2024-10-30 DIAGNOSIS — L93.2 CUTANEOUS LUPUS ERYTHEMATOSUS: ICD-10-CM

## 2024-10-30 DIAGNOSIS — Z23 NEED FOR INFLUENZA VACCINATION: ICD-10-CM

## 2024-10-30 DIAGNOSIS — Z00.00 ANNUAL PHYSICAL EXAM: Primary | ICD-10-CM

## 2024-10-30 DIAGNOSIS — R73.03 PREDIABETES: ICD-10-CM

## 2024-10-30 LAB
ALBUMIN SERPL-MCNC: 4.5 G/DL (ref 3.5–5.2)
ALBUMIN/GLOB SERPL: 1.5 G/DL
ALP SERPL-CCNC: 90 U/L (ref 39–117)
ALT SERPL W P-5'-P-CCNC: 25 U/L (ref 1–33)
ANION GAP SERPL CALCULATED.3IONS-SCNC: 10.3 MMOL/L (ref 5–15)
AST SERPL-CCNC: 25 U/L (ref 1–32)
BASOPHILS # BLD AUTO: 0.06 10*3/MM3 (ref 0–0.2)
BASOPHILS NFR BLD AUTO: 0.9 % (ref 0–1.5)
BILIRUB SERPL-MCNC: 0.3 MG/DL (ref 0–1.2)
BUN SERPL-MCNC: 9 MG/DL (ref 6–20)
BUN/CREAT SERPL: 10.5 (ref 7–25)
CALCIUM SPEC-SCNC: 9.8 MG/DL (ref 8.6–10.5)
CHLORIDE SERPL-SCNC: 101 MMOL/L (ref 98–107)
CHOLEST SERPL-MCNC: 217 MG/DL (ref 0–200)
CO2 SERPL-SCNC: 25.7 MMOL/L (ref 22–29)
CREAT SERPL-MCNC: 0.86 MG/DL (ref 0.57–1)
DEPRECATED RDW RBC AUTO: 41.8 FL (ref 37–54)
EGFRCR SERPLBLD CKD-EPI 2021: 86.6 ML/MIN/1.73
EOSINOPHIL # BLD AUTO: 0.17 10*3/MM3 (ref 0–0.4)
EOSINOPHIL NFR BLD AUTO: 2.5 % (ref 0.3–6.2)
ERYTHROCYTE [DISTWIDTH] IN BLOOD BY AUTOMATED COUNT: 12 % (ref 12.3–15.4)
GLOBULIN UR ELPH-MCNC: 3.1 GM/DL
GLUCOSE SERPL-MCNC: 77 MG/DL (ref 65–99)
HBA1C MFR BLD: 5.8 % (ref 4.8–5.6)
HCT VFR BLD AUTO: 44.2 % (ref 34–46.6)
HDLC SERPL-MCNC: 39 MG/DL (ref 40–60)
HGB BLD-MCNC: 15.2 G/DL (ref 12–15.9)
IMM GRANULOCYTES # BLD AUTO: 0.01 10*3/MM3 (ref 0–0.05)
IMM GRANULOCYTES NFR BLD AUTO: 0.1 % (ref 0–0.5)
LDLC SERPL CALC-MCNC: 144 MG/DL (ref 0–100)
LDLC/HDLC SERPL: 3.59 {RATIO}
LYMPHOCYTES # BLD AUTO: 1.89 10*3/MM3 (ref 0.7–3.1)
LYMPHOCYTES NFR BLD AUTO: 27.8 % (ref 19.6–45.3)
MCH RBC QN AUTO: 32.5 PG (ref 26.6–33)
MCHC RBC AUTO-ENTMCNC: 34.4 G/DL (ref 31.5–35.7)
MCV RBC AUTO: 94.6 FL (ref 79–97)
MONOCYTES # BLD AUTO: 0.45 10*3/MM3 (ref 0.1–0.9)
MONOCYTES NFR BLD AUTO: 6.6 % (ref 5–12)
NEUTROPHILS NFR BLD AUTO: 4.23 10*3/MM3 (ref 1.7–7)
NEUTROPHILS NFR BLD AUTO: 62.1 % (ref 42.7–76)
NRBC BLD AUTO-RTO: 0 /100 WBC (ref 0–0.2)
PLATELET # BLD AUTO: 337 10*3/MM3 (ref 140–450)
PMV BLD AUTO: 9.3 FL (ref 6–12)
POTASSIUM SERPL-SCNC: 4.3 MMOL/L (ref 3.5–5.2)
PROT SERPL-MCNC: 7.6 G/DL (ref 6–8.5)
RBC # BLD AUTO: 4.67 10*6/MM3 (ref 3.77–5.28)
SODIUM SERPL-SCNC: 137 MMOL/L (ref 136–145)
TRIGL SERPL-MCNC: 189 MG/DL (ref 0–150)
TSH SERPL DL<=0.05 MIU/L-ACNC: 1.38 UIU/ML (ref 0.27–4.2)
VLDLC SERPL-MCNC: 34 MG/DL (ref 5–40)
WBC NRBC COR # BLD AUTO: 6.81 10*3/MM3 (ref 3.4–10.8)

## 2024-10-30 PROCEDURE — 90656 IIV3 VACC NO PRSV 0.5 ML IM: CPT | Performed by: STUDENT IN AN ORGANIZED HEALTH CARE EDUCATION/TRAINING PROGRAM

## 2024-10-30 PROCEDURE — 80061 LIPID PANEL: CPT | Performed by: STUDENT IN AN ORGANIZED HEALTH CARE EDUCATION/TRAINING PROGRAM

## 2024-10-30 PROCEDURE — 80050 GENERAL HEALTH PANEL: CPT | Performed by: STUDENT IN AN ORGANIZED HEALTH CARE EDUCATION/TRAINING PROGRAM

## 2024-10-30 PROCEDURE — 83036 HEMOGLOBIN GLYCOSYLATED A1C: CPT | Performed by: STUDENT IN AN ORGANIZED HEALTH CARE EDUCATION/TRAINING PROGRAM

## 2024-10-30 PROCEDURE — 90471 IMMUNIZATION ADMIN: CPT | Performed by: STUDENT IN AN ORGANIZED HEALTH CARE EDUCATION/TRAINING PROGRAM

## 2024-10-30 PROCEDURE — 99396 PREV VISIT EST AGE 40-64: CPT | Performed by: STUDENT IN AN ORGANIZED HEALTH CARE EDUCATION/TRAINING PROGRAM

## 2024-10-30 RX ORDER — COPPER 313.4 MG/1
INTRAUTERINE DEVICE INTRAUTERINE
COMMUNITY
Start: 2024-10-29

## 2024-10-30 NOTE — PROGRESS NOTES
"Chief Complaint  Annual Exam    Subjective      History of Present Illness    Sena Reyes is a 44 y.o. female who presents to [facility]   with recent diagnosis of cutaneous lupus presentS for annual physical today.  Patient states that her dermatologist recently diagnosed her with cutaneous lupus and that she should discontinue omeprazole indefinitely and start a different medication for GERD.  Patient states she has had reflux symptoms intermittently she does not take omeprazole daily.  She has never seen her rheumatologist.  She is agreeable to referral today.  She is currently on Plaquenil she has ParaGard IUD.  She has no acute complaints today.         Objective   Vital Signs:   Vitals:    10/30/24 1110   BP: 100/70   Temp: 97.3 °F (36.3 °C)   SpO2: 98%   Weight: 90.3 kg (199 lb)   Height: 175.3 cm (69\")     Body mass index is 29.39 kg/m².    Wt Readings from Last 3 Encounters:   10/30/24 90.3 kg (199 lb)   10/29/24 91.1 kg (200 lb 12.8 oz)   07/16/24 93.8 kg (206 lb 12.8 oz)     BP Readings from Last 3 Encounters:   10/30/24 100/70   10/29/24 123/82   08/05/24 109/73       Health Maintenance   Topic Date Due    BMI FOLLOWUP  04/23/2025    PAP SMEAR  06/27/2025    ANNUAL PHYSICAL  10/30/2025    MAMMOGRAM  10/14/2026    TDAP/TD VACCINES (2 - Td or Tdap) 08/01/2027    HEPATITIS C SCREENING  Completed    COVID-19 Vaccine  Completed    INFLUENZA VACCINE  Completed    Pneumococcal Vaccine 0-64  Aged Out       Physical Exam   General:  AAO x3, no acute distress.  Eyes:  EOMI, PERRLA  Ears/Nose/Mouth/Throat:  Moist mucous membranes  Respiratory:  CTA bilaterally, no wheezes rales or rhonchi  Cardiovascular:  RRR no murmur rubs or gallops  Gastrointestinal:  Abdomen soft nondistended nontender bowel sounds present x4 quadrants  Musculoskeletal:  Moves all 4 extremities spontaneously, no apparent weakness  Skin: Erythematous well-demarcated rash noted in the anterior scalp moves into " the hairline warm, dry, no skin rashes or lesions  Neurologic:  AAO x3, cranial nerves 2-12 grossly intact, no focal neuro deficits  Psychiatric:  Normal mood and affect  Result Review :     The following data was reviewed by: Evens Bell MD on 10/30/2024:      Procedures          Diagnoses and all orders for this visit:    1. Annual physical exam (Primary)  -     CBC & Differential  -     Comprehensive Metabolic Panel  -     Lipid Panel  -     TSH    2. Need for influenza vaccination    3. Prediabetes  -     Hemoglobin A1c    4. Cutaneous lupus erythematosus  -     Ambulatory Referral to Rheumatology    Other orders  -     Fluzone >6mos ([id number])                   FOLLOW UP  No follow-ups on file.  Patient was given instructions and counseling regarding her condition or for health maintenance advice. Please see specific information pulled into the AVS if appropriate.       Evens Bell MD  10/31/24  08:58 EDT    CURRENT & DISCONTINUED MEDICATIONS  Current Outpatient Medications   Medication Instructions    Calcium Carb-Cholecalciferol 600-12.5 MG-MCG capsule Take  by mouth.    cetirizine (ZYRTEC) 10 mg, Daily    Co-Enzyme Q10 200 MG capsule 1 capsule, Daily    desonide (DESOWEN) 0.05 % lotion     famotidine (PEPCID) Taking 20 mg tablet daily    hydroxychloroquine (PLAQUENIL) 200 mg, Daily    magnesium oxide (MAG-OX) 400 mg, Daily    Paragard Intrauterine Copper intrauterine device IUD     Qulipta 60 mg, Oral, Daily    ubrogepant (UBRELVY) 100 mg, Oral, Once As Needed, One tablet at HA onset, may repeat once in 2 hours if needed.       Medications Discontinued During This Encounter   Medication Reason    Fremanezumab-vfrm (Ajovy) 225 MG/1.5ML solution auto-injector Patient Reported Not Taking    norethindrone (MICRONOR) 0.35 MG tablet Patient Reported Not Taking    acetaminophen (TYLENOL) 500 MG tablet Patient Reported Not Taking

## 2024-11-08 ENCOUNTER — TELEPHONE (OUTPATIENT)
Dept: FAMILY MEDICINE CLINIC | Facility: CLINIC | Age: 44
End: 2024-11-08
Payer: COMMERCIAL

## 2024-11-25 ENCOUNTER — LAB (OUTPATIENT)
Dept: OBSTETRICS AND GYNECOLOGY | Facility: CLINIC | Age: 44
End: 2024-11-25
Payer: COMMERCIAL

## 2024-11-25 DIAGNOSIS — Z30.014 ENCOUNTER FOR INITIAL PRESCRIPTION OF INTRAUTERINE CONTRACEPTIVE DEVICE (IUD): ICD-10-CM

## 2024-11-25 LAB — HCG INTACT+B SERPL-ACNC: <0.5 MIU/ML

## 2024-11-25 PROCEDURE — 84702 CHORIONIC GONADOTROPIN TEST: CPT | Performed by: NURSE PRACTITIONER

## 2024-11-26 ENCOUNTER — PROCEDURE VISIT (OUTPATIENT)
Dept: OBSTETRICS AND GYNECOLOGY | Facility: CLINIC | Age: 44
End: 2024-11-26
Payer: COMMERCIAL

## 2024-11-26 VITALS
SYSTOLIC BLOOD PRESSURE: 100 MMHG | WEIGHT: 199 LBS | DIASTOLIC BLOOD PRESSURE: 73 MMHG | HEART RATE: 89 BPM | HEIGHT: 69 IN | BODY MASS INDEX: 29.47 KG/M2

## 2024-11-26 DIAGNOSIS — Z30.430 ENCOUNTER FOR INSERTION OF INTRAUTERINE CONTRACEPTIVE DEVICE (IUD): Primary | ICD-10-CM

## 2024-11-26 RX ORDER — COPPER 313.4 MG/1
INTRAUTERINE DEVICE INTRAUTERINE ONCE
Status: COMPLETED | OUTPATIENT
Start: 2024-11-26 | End: 2024-11-26

## 2024-11-26 RX ADMIN — COPPER 1 EACH: 313.4 INTRAUTERINE DEVICE INTRAUTERINE at 09:49

## 2024-11-26 NOTE — PROGRESS NOTES
"  IUD Placement    Sex in last 2 weeks:  No  Bhcg: negative  Consent signed: Yes    CC: Presents for IUD placement    Procedure was explained in detail.  She understands the potential risks include, but are not limited to, failure (pregnancy), pain, bleeding, uterine perforation, and infection.  Her questions have been answered.      Subjective:  Here for paragard insertion. No c/o voiced.     Objective:  /73   Pulse 89   Ht 175.3 cm (69\")   Wt 90.3 kg (199 lb)   BMI 29.39 kg/m²   General- NAD, alert and oriented, appropriate  Psych- Normal mood, good memory  Abdomen- Soft, non distended, non tender, no masses  External genitalia- Normal, no lesions  Vagina- Normal, no discharge  Uterus- Normal size, shape & consistency.  Non tender, mobile.  Cvx- Normal without lesion or discharge.     Betadine x3.  Tenaculum placed.  Uterus sounded to 9cm.    Paragard IUD placed without difficulty.    Strings cut 1cm.    Patient tolerated well.      Assessment and Plan:  Diagnoses and all orders for this visit:    1. Encounter for insertion of intrauterine contraceptive device (IUD) (Primary)  -     Paragard Intrauterine Copper IUD          Counseling:  She was counseled on the use of the IUD.  It provides contraception for 5 years (Mirena/Kyleena/Liletta) or 3 years (Shreya) or 10 years (Copper).  Failure is <1%.  It does not protect her from STDs and condoms are recommended.  She has been instructed to check the strings monthly.       PRECAUTIONS-- If she has any fevers >101.5F, and abdominal/pelvic pain, or bleeding > 1pad/2hours, she needs to call our office or on call/ER (after hours/weekend).  She understands the potential risk of pelvic inflammatory disease and the potential for an effect on her future fertility if she does not seek immediate evaluation.  Cramping due to the IUD should be controlled with a OTC reliever/NSAID.  If not, she should call our office.  If she misses a period and has a positive pregnancy " test she must immediately seek medical attention due to the risk of ectopic pregnancy which can be life threatening.  She understands removal can sometimes be difficult and can require surgery.    Follow Up:  Return in about 4 weeks (around 12/24/2024) for post iud check.      Yazmin Lawler, AMA  11/26/2024

## 2024-12-04 ENCOUNTER — OFFICE VISIT (OUTPATIENT)
Dept: NEUROLOGY | Facility: CLINIC | Age: 44
End: 2024-12-04
Payer: COMMERCIAL

## 2024-12-04 VITALS
HEIGHT: 69 IN | BODY MASS INDEX: 29.84 KG/M2 | WEIGHT: 201.5 LBS | HEART RATE: 72 BPM | DIASTOLIC BLOOD PRESSURE: 62 MMHG | SYSTOLIC BLOOD PRESSURE: 112 MMHG

## 2024-12-04 DIAGNOSIS — G43.009 MIGRAINE WITHOUT AURA AND WITHOUT STATUS MIGRAINOSUS, NOT INTRACTABLE: Primary | ICD-10-CM

## 2024-12-04 RX ORDER — ATOGEPANT 60 MG/1
60 TABLET ORAL DAILY
Qty: 90 TABLET | Refills: 1 | Status: SHIPPED | OUTPATIENT
Start: 2024-12-04

## 2024-12-04 NOTE — PROGRESS NOTES
"Chief Complaint  Migraine    Subjective          Sena Reyes presents to [facility]  History of Present Illness    History of Present Illness  The patient is a 44-year-old female who presents to the office for follow-up for migraine, remains on Qulipta for preventative therapy and Ubrelvy as needed.    She reports experiencing an aura in her left eye prior to the onset of some migraines, which she describes as resembling fireworks. This phenomenon has occurred three times. She began taking hydroxychloroquine for her lupus in October 2024, and in November 2024, she experienced four days of headaches. She notes that her headaches have been improving since starting the medication.       Objective   Vital Signs:   /62   Pulse 72   Ht 175.3 cm (69\")   Wt 91.4 kg (201 lb 8 oz)   BMI 29.76 kg/m²     Physical Exam  HENT:      Head: Normocephalic.   Pulmonary:      Effort: Pulmonary effort is normal.   Neurological:      Mental Status: She is alert and oriented to person, place, and time.      Sensory: Sensation is intact.      Motor: Motor function is intact.      Coordination: Coordination is intact.      Deep Tendon Reflexes: Reflexes are normal and symmetric.        Neurological Exam  Mental Status  Alert. Oriented to person, place, and time.    Sensory  Normal sensation.    Reflexes  Deep tendon reflexes are 2+ and symmetric in all four extremities.    Coordination    Finger-to-nose, rapid alternating movements and heel-to-shin normal bilaterally without dysmetria.      Result Review :   CBC:  Lab Results   Component Value Date    WBC 6.81 10/30/2024    RBC 4.67 10/30/2024    HGB 15.2 10/30/2024    HCT 44.2 10/30/2024    MCV 94.6 10/30/2024    MCH 32.5 10/30/2024    MCHC 34.4 10/30/2024    RDW 12.0 (L) 10/30/2024     10/30/2024     CMP:  Lab Results   Component Value Date    BUN 9 10/30/2024    CREATININE 0.86 10/30/2024     10/30/2024    K 4.3 10/30/2024 "     10/30/2024    CALCIUM 9.8 10/30/2024    ALBUMIN 4.5 10/30/2024    BILITOT 0.3 10/30/2024    ALKPHOS 90 10/30/2024    AST 25 10/30/2024    ALT 25 10/30/2024     TSH/FREE T4:   Lab Results   Component Value Date    TSH 1.380 10/30/2024    FREET4 0.95 06/27/2022                Assessment and Plan    Diagnoses and all orders for this visit:    1. Migraine without aura and without status migrainosus, not intractable (Primary)    Other orders  -     Atogepant (Qulipta) 60 MG tablet; Take 1 tablet by mouth Daily.  Dispense: 90 tablet; Refill: 1  -     ubrogepant (Ubrelvy) 100 MG tablet; Take 1 tablet by mouth 1 (One) Time As Needed (migraine) for up to 30 doses. One tablet at HA onset, may repeat once in 2 hours if needed.  Dispense: 10 tablet; Refill: 5        Assessment & Plan  1. Migraine.  She reports experiencing visual aura in her left eye before some migraines, which is not a cause for concern. She is currently on Qulipta for preventative therapy and Ubrelvy as needed. Continuation of Qulipta and Ubrelvy is recommended. A prescription for Qulipta will be sent to [facility]. She is advised to contact the office if she encounters any issues with the pharmacy, as her co-pay card should still be effective.    2. Lupus.  She started taking hydroxychloroquine in October and reports a reduction in headache days in November. The combination of hydroxychloroquine for lupus and Qulipta for migraines appears to be effective. Continuation of hydroxychloroquine is recommended.    Follow-up  Return in 6 months for follow up.         Follow Up   Return in about 6 months (around 6/4/2025) for Migraine f/u.  Patient was given instructions and counseling regarding her condition or for health maintenance advice. Please see specific information pulled into the AVS if appropriate.       Patient or patient representative verbalized consent for the use of Ambient Listening during the visit with  AMA Costa for chart  documentation. 12/9/2024  08:42 EST

## 2024-12-13 ENCOUNTER — PRIOR AUTHORIZATION (OUTPATIENT)
Dept: NEUROLOGY | Facility: CLINIC | Age: 44
End: 2024-12-13
Payer: COMMERCIAL

## 2024-12-23 ENCOUNTER — OFFICE VISIT (OUTPATIENT)
Dept: OBSTETRICS AND GYNECOLOGY | Facility: CLINIC | Age: 44
End: 2024-12-23
Payer: COMMERCIAL

## 2024-12-23 VITALS
DIASTOLIC BLOOD PRESSURE: 72 MMHG | SYSTOLIC BLOOD PRESSURE: 108 MMHG | HEART RATE: 65 BPM | WEIGHT: 203 LBS | HEIGHT: 69 IN | BODY MASS INDEX: 30.07 KG/M2

## 2024-12-23 DIAGNOSIS — Z30.431 ENCOUNTER FOR ROUTINE CHECKING OF INTRAUTERINE CONTRACEPTIVE DEVICE (IUD): Primary | ICD-10-CM

## 2024-12-23 NOTE — PROGRESS NOTES
"  Post IUD Visit      CC: IUD follow-up    HPI:  Pain/Cramping:  No  Vaginal Bleeding:  has had one normal menses since insertion of paragard  Pain w sex: No  Feels strings easily:  has not tried    Subjective   Sena Reyes is a 44 y.o. female, , who presents for IUD check follow up.  She had an IUD placed 4 weeks ago.  Her LMP was No LMP recorded. Patient has had an implant..  Since the IUD placement, the patient has not had any unusual complaints.     The additional following portions of the patient's history were reviewed and updated as appropriate: allergies, current medications, past family history, past medical history, past social history, past surgical history, and problem list.    Review of Systems  All other systems reviewed and are negative.     I have reviewed and agree with the HPI, ROS, and historical information as entered above. Yazmin Lawler, APRN    Objective   /72   Pulse 65   Ht 175.3 cm (69\")   Wt 92.1 kg (203 lb)   BMI 29.98 kg/m²     Physical Exam  Vitals reviewed.   Genitourinary:     General: Normal vulva.      Vagina: Normal. No foreign body.      Cervix: Normal.      Uterus: Normal. Not tender.       Adnexa: Right adnexa normal and left adnexa normal.        Right: No tenderness.          Left: No tenderness.        Comments: Iud strings present at 1.5 cm  Skin:     General: Skin is warm and dry.   Neurological:      Mental Status: She is alert and oriented to person, place, and time.         Assessment & Plan       Diagnoses and all orders for this visit:    1. Encounter for routine checking of intrauterine contraceptive device (IUD) (Primary)    Happy with paragard and desires to continue.     Plan     Return in about 6 months (around 7/3/2025) for Annual physical.      Yazmin Lawler, AMA  2024    "

## 2025-01-02 ENCOUNTER — TELEPHONE (OUTPATIENT)
Dept: OBSTETRICS AND GYNECOLOGY | Facility: CLINIC | Age: 45
End: 2025-01-02
Payer: COMMERCIAL

## 2025-01-02 RX ORDER — FLUCONAZOLE 150 MG/1
150 TABLET ORAL
Qty: 2 TABLET | Refills: 0 | Status: SHIPPED | OUTPATIENT
Start: 2025-01-02 | End: 2025-01-06

## 2025-01-02 NOTE — TELEPHONE ENCOUNTER
Patient last seen in office on 12/23/24. Patient having vaginal itching and discharge for two days. Patient requesting a prescription for yeast infection.

## 2025-01-02 NOTE — TELEPHONE ENCOUNTER
Caller: Sena Reyes    Relationship: Self    Best call back number: [phone]    What was the call regarding: PT REQUESTING MEDICATION FOR A YEAST INFECTION.     CONFIRMED THE PHARMACY ON FILE

## 2025-07-14 ENCOUNTER — CONSULT (OUTPATIENT)
Dept: ONCOLOGY | Facility: HOSPITAL | Age: 45
End: 2025-07-14
Payer: COMMERCIAL

## 2025-07-14 ENCOUNTER — LAB (OUTPATIENT)
Dept: ONCOLOGY | Facility: HOSPITAL | Age: 45
End: 2025-07-14
Payer: COMMERCIAL

## 2025-07-14 VITALS
HEART RATE: 78 BPM | HEIGHT: 69 IN | DIASTOLIC BLOOD PRESSURE: 64 MMHG | TEMPERATURE: 97.7 F | OXYGEN SATURATION: 100 % | RESPIRATION RATE: 18 BRPM | WEIGHT: 193.12 LBS | BODY MASS INDEX: 28.6 KG/M2 | SYSTOLIC BLOOD PRESSURE: 115 MMHG

## 2025-07-14 DIAGNOSIS — C88.40 PRIMARY CUTANEOUS MARGINAL ZONE B-CELL LYMPHOMA: Primary | ICD-10-CM

## 2025-07-14 DIAGNOSIS — C88.40 PRIMARY CUTANEOUS MARGINAL ZONE B-CELL LYMPHOMA: ICD-10-CM

## 2025-07-14 DIAGNOSIS — C85.80 MARGINAL ZONE B-CELL LYMPHOMA: ICD-10-CM

## 2025-07-14 LAB
ALBUMIN SERPL-MCNC: 4.8 G/DL (ref 3.5–5.2)
ALBUMIN/GLOB SERPL: 1.7 G/DL
ALP SERPL-CCNC: 74 U/L (ref 39–117)
ALT SERPL W P-5'-P-CCNC: 24 U/L (ref 1–33)
ANION GAP SERPL CALCULATED.3IONS-SCNC: 11.7 MMOL/L (ref 5–15)
AST SERPL-CCNC: 21 U/L (ref 1–32)
BASOPHILS # BLD AUTO: 0.09 10*3/MM3 (ref 0–0.2)
BASOPHILS NFR BLD AUTO: 1.1 % (ref 0–1.5)
BILIRUB SERPL-MCNC: 0.4 MG/DL (ref 0–1.2)
BUN SERPL-MCNC: 13.1 MG/DL (ref 6–20)
BUN/CREAT SERPL: 14.6 (ref 7–25)
CALCIUM SPEC-SCNC: 10 MG/DL (ref 8.6–10.5)
CHLORIDE SERPL-SCNC: 104 MMOL/L (ref 98–107)
CO2 SERPL-SCNC: 23.3 MMOL/L (ref 22–29)
CREAT SERPL-MCNC: 0.9 MG/DL (ref 0.57–1)
DEPRECATED RDW RBC AUTO: 43.7 FL (ref 37–54)
EGFRCR SERPLBLD CKD-EPI 2021: 82 ML/MIN/1.73
EOSINOPHIL # BLD AUTO: 0.17 10*3/MM3 (ref 0–0.4)
EOSINOPHIL NFR BLD AUTO: 2.2 % (ref 0.3–6.2)
ERYTHROCYTE [DISTWIDTH] IN BLOOD BY AUTOMATED COUNT: 12.7 % (ref 12.3–15.4)
GLOBULIN UR ELPH-MCNC: 2.8 GM/DL
GLUCOSE SERPL-MCNC: 124 MG/DL (ref 65–99)
HBV SURFACE AB SER RIA-ACNC: REACTIVE
HBV SURFACE AG SERPL QL IA: NORMAL
HCG SERPL QL: NEGATIVE
HCT VFR BLD AUTO: 41.3 % (ref 34–46.6)
HGB BLD-MCNC: 13.6 G/DL (ref 12–15.9)
IMM GRANULOCYTES # BLD AUTO: 0.01 10*3/MM3 (ref 0–0.05)
IMM GRANULOCYTES NFR BLD AUTO: 0.1 % (ref 0–0.5)
LDH SERPL-CCNC: 171 U/L (ref 135–214)
LYMPHOCYTES # BLD AUTO: 2.49 10*3/MM3 (ref 0.7–3.1)
LYMPHOCYTES NFR BLD AUTO: 31.6 % (ref 19.6–45.3)
MCH RBC QN AUTO: 30.6 PG (ref 26.6–33)
MCHC RBC AUTO-ENTMCNC: 32.9 G/DL (ref 31.5–35.7)
MCV RBC AUTO: 93 FL (ref 79–97)
MONOCYTES # BLD AUTO: 0.55 10*3/MM3 (ref 0.1–0.9)
MONOCYTES NFR BLD AUTO: 7 % (ref 5–12)
NEUTROPHILS NFR BLD AUTO: 4.57 10*3/MM3 (ref 1.7–7)
NEUTROPHILS NFR BLD AUTO: 58 % (ref 42.7–76)
NRBC BLD AUTO-RTO: 0 /100 WBC (ref 0–0.2)
PLATELET # BLD AUTO: 294 10*3/MM3 (ref 140–450)
PMV BLD AUTO: 9.5 FL (ref 6–12)
POTASSIUM SERPL-SCNC: 4.2 MMOL/L (ref 3.5–5.2)
PROT SERPL-MCNC: 7.6 G/DL (ref 6–8.5)
RBC # BLD AUTO: 4.44 10*6/MM3 (ref 3.77–5.28)
SODIUM SERPL-SCNC: 139 MMOL/L (ref 136–145)
WBC NRBC COR # BLD AUTO: 7.88 10*3/MM3 (ref 3.4–10.8)

## 2025-07-14 PROCEDURE — 83615 LACTATE (LD) (LDH) ENZYME: CPT

## 2025-07-14 PROCEDURE — 86704 HEP B CORE ANTIBODY TOTAL: CPT

## 2025-07-14 PROCEDURE — 85025 COMPLETE CBC W/AUTO DIFF WBC: CPT

## 2025-07-14 PROCEDURE — G0463 HOSPITAL OUTPT CLINIC VISIT: HCPCS | Performed by: INTERNAL MEDICINE

## 2025-07-14 PROCEDURE — 36415 COLL VENOUS BLD VENIPUNCTURE: CPT

## 2025-07-14 PROCEDURE — 80053 COMPREHEN METABOLIC PANEL: CPT

## 2025-07-14 PROCEDURE — 99204 OFFICE O/P NEW MOD 45 MIN: CPT | Performed by: INTERNAL MEDICINE

## 2025-07-14 PROCEDURE — 87340 HEPATITIS B SURFACE AG IA: CPT

## 2025-07-14 PROCEDURE — 84703 CHORIONIC GONADOTROPIN ASSAY: CPT

## 2025-07-14 PROCEDURE — 86706 HEP B SURFACE ANTIBODY: CPT

## 2025-07-14 NOTE — PROGRESS NOTES
Chief Complaint  NEW PT - ONCOLOGY    Felipe Rayo MD Lutfi, MD Roxi Horner          Sena Reyes presents to [facility]EMAT[facility] & ONCOL[facility]Y for cutaneous marginal zone lymphoma    Patient is a very pleasant 44-year-old female with past medical history of migraine, allergic rhinitis who presents for oncology evaluation regarding cutaneous marginal zone lymphoma.  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  Patient has been on Plaquenil and desonide topical for this with resolution.  Followed closely by dermatology.  Patient with right breast lesion biopsied by dermatology.  Pathology returned as atypical lymphoid plasmacytic infiltrate consistent with marginal zone lymphoma.  IgH rearrangement by PCR was positive for B-cell clonality.  Patient reports the lesion of her right breast hurts and itches.  She has multiple other similar lesions throughout her torso.  She reports they are itch in the area of all developed since the initial lesion of her right breast.  Right breast lesion has been there about 6 months.  She does report night sweats occurring since that time.  She has occasional chills but no fevers.  No weight loss.  She denies any lymphadenopathy.  She does have lesions on her back and legs that dermatology believes to be epidermoid cysts.     Review of Systems   Constitutional:  Positive for chills. Negative for fever and unexpected weight loss.   Skin:  Positive for skin lesions.   All other systems reviewed and are negative.    Current Outpatient Medications on File Prior to Visit   Medication Sig Dispense Refill    Atogepant (Qulipta) 60 MG tablet Take 1 tablet by mouth Daily. 90 tablet 1    Calcium Carb-Cholecalciferol 600-12.5 MG-MCG capsule Take  by mouth.      cetirizine (zyrTEC) 10 MG tablet Take 1 tablet by mouth Daily.      Co-Enzyme Q10 200 MG capsule Take 1 capsule by mouth Daily.      desonide (DESOWEN) 0.05  % lotion       famotidine (PEPCID) Taking 20 mg tablet daily      hydroxychloroquine (PLAQUENIL) 200 MG tablet Take 1 tablet by mouth Daily.      magnesium oxide (MAG-OX) 400 MG tablet Take 1 tablet by mouth Daily.      Paragard Intrauterine Copper intrauterine device IUD       ubrogepant (Ubrelvy) 100 MG tablet Take 1 tablet by mouth 1 (One) Time As Needed (migraine) for up to 30 doses. One tablet at HA onset, may repeat once in 2 hours if needed. 10 tablet 5     No current facility-administered medications on file prior to visit.       Allergies   Allergen Reactions    Sulfa Antibiotics Other (See Comments)     Pt would like to stay away from them due to her Lupus     Topiramate Other (See Comments)     Tingling in hands and feet, spotting blood     Past Medical History:   Diagnosis Date    Cutaneous lupus erythematosus     Factor V Leiden     Heterozygote    History of medical problems 2016    Blood Factor V Leiden    IUFD at less than 20 weeks of gestation 06/17/2021    Lupus Jan 2024    Migraine without aura     MTHFR mutation w NORMAL homocysteine     PMS (premenstrual syndrome)     Recurrent pregnancy loss, antepartum condition or complication     Urinary tract infection     Varicella     Visual impairment Age 20    Wear glasses     History reviewed. No pertinent surgical history.  Social History     Socioeconomic History    Marital status:      Spouse name: Gaudencio    Number of children: 5   Tobacco Use    Smoking status: Never     Passive exposure: Never    Smokeless tobacco: Never   Vaping Use    Vaping status: Never Used   Substance and Sexual Activity    Alcohol use: Never    Drug use: Never    Sexual activity: Yes     Partners: Male     Birth control/protection: I.U.D.     Family History   Problem Relation Age of Onset    Diabetes Father     Hearing loss Father     Heart disease Father     Hyperlipidemia Father     Stroke Father     Hypertension Father     Cervical cancer Mother 34     "Hyperlipidemia Mother     Hypertension Mother     Heart disease Paternal Grandfather     Breast cancer Paternal Grandmother 60    Lymphoma Maternal Grandmother 58    Miscarriages / Stillbirths Maternal Grandmother     Diabetes Maternal Grandfather     Prostate cancer Maternal Uncle     Stroke Paternal Aunt     Colon cancer Neg Hx     Ovarian cancer Neg Hx     Uterine cancer Neg Hx     Deep vein thrombosis Neg Hx     Pulmonary embolism Neg Hx        Objective   Physical Exam  Well appearing patient in no acute distress on RA  Anicteric sclerae, + raised erythematous lesion about 4 cm in size right breast with small <1 cm satellite rasied lesion more medially  Respirations non-labored  Awake, alert, and oriented x 4. Speech intact. No gross neurologic deficit  Appropriate mood and affect    Vitals:    07/14/25 1520   BP: 115/64   Pulse: 78   Resp: 18   Temp: 97.7 °F (36.5 °C)   TempSrc: Oral   SpO2: 100%   Weight: 87.6 kg (193 lb 2 oz)   Height: 175.3 cm (69.02\")   PainSc: 0-No pain               PHQ-9 Total Score:                      Result Review :   The following data was reviewed by: Sam Roberts MD on 07/14/25:  Lab Results   Component Value Date    HGB 13.6 07/14/2025    HCT 41.3 07/14/2025    MCV 93.0 07/14/2025     07/14/2025    WBC 7.88 07/14/2025    NEUTROABS 4.57 07/14/2025    LYMPHSABS 2.49 07/14/2025    MONOSABS 0.55 07/14/2025    EOSABS 0.17 07/14/2025    BASOSABS 0.09 07/14/2025     Lab Results   Component Value Date    GLUCOSE 124 (H) 07/14/2025    BUN 13.1 07/14/2025    CREATININE 0.90 07/14/2025     07/14/2025    K 4.2 07/14/2025     07/14/2025    CO2 23.3 07/14/2025    CALCIUM 10.0 07/14/2025    PROTEINTOT 7.6 07/14/2025    ALBUMIN 4.8 07/14/2025    BILITOT 0.4 07/14/2025    ALKPHOS 74 07/14/2025    AST 21 07/14/2025    ALT 24 07/14/2025     Lab Results   Component Value Date    FREET4 0.95 06/27/2022    TSH 1.380 10/30/2024            Labs personally reviewed. CBC " normal. CMP normal outside of elevated glucose.     Pathology personally reviewed      No radiology results for the last 30 days.        Assessment and Plan    Diagnoses and all orders for this visit:    1. Primary cutaneous marginal zone B-cell lymphoma (Primary)  -     CBC & Differential; Future  -     Comprehensive Metabolic Panel; Future  -     Lactate Dehydrogenase; Future  -     hCG, Serum, Qualitative; Future  -     NM PET/CT Whole Body; Future  -     Hepatitis B Surface Antibody; Future  -     Hepatitis B Core Antibody, Total; Future  -     Hepatitis B Surface Antigen; Future    2. Marginal zone B-cell lymphoma  -     Hepatitis B Surface Antibody; Future  -     Hepatitis B Surface Antigen; Future      Primary cutaneous Marginal zone B cell lymphoma  Right breast lesion appeared about 6 to 7 months ago.  It has been biopsy-proven to be cutaneous marginal zone lymphoma.  Patient has similar lesions throughout her torso.  They are smaller and nonpainful.  They are itchy.  They have all developed since her initial right breast lesion did.  Patient does report subjective night sweats.  She has occasional chills.  No fevers or weight loss.  No lymphadenopathy noted.  She has other lesions that are likely cysts that are subcutaneous.    Recommend CBC, CMP, LDH.  Recommend hepatitis studies in case of need of rituximab which could reactivate hepatitis viruses.  Will get baseline whole-body PET scan.  Based on findings of this and if disease is confined to skin/torso, recommendation likely be for radiation as well as topical steroids.  Preferentially prefer to hold rituximab for relapsed disease.       I spent 45 minutes caring for Sena on this date of service. This time includes time spent by me in the following activities:preparing for the visit, reviewing tests, obtaining and/or reviewing a separately obtained history, performing a medically appropriate examination and/or evaluation , counseling and educating  the patient/family/caregiver, ordering medications, tests, or procedures, referring and communicating with other health care professionals , documenting information in the medical record, independently interpreting results and communicating that information with the patient/family/caregiver, and care coordination    Patient Follow Up: after PET  Patient was given instructions and counseling regarding her condition or for health maintenance advice. Please see specific information pulled into the AVS if appropriate.

## 2025-07-15 LAB — HBV CORE AB SERPL QL IA: NEGATIVE

## 2025-07-18 ENCOUNTER — HOSPITAL ENCOUNTER (OUTPATIENT)
Dept: PET IMAGING | Facility: HOSPITAL | Age: 45
Discharge: HOME | End: 2025-07-18
Payer: COMMERCIAL

## 2025-07-18 ENCOUNTER — PATIENT ROUNDING (BHMG ONLY) (OUTPATIENT)
Dept: ONCOLOGY | Facility: HOSPITAL | Age: 45
End: 2025-07-18
Payer: COMMERCIAL

## 2025-07-18 DIAGNOSIS — C88.40 PRIMARY CUTANEOUS MARGINAL ZONE B-CELL LYMPHOMA: ICD-10-CM

## 2025-07-18 PROCEDURE — 78816 PET IMAGE W/CT FULL BODY: CPT

## 2025-07-18 PROCEDURE — 34310000005 FLUDEOXYGLUCOSE F18 SOLUTION: Performed by: INTERNAL MEDICINE

## 2025-07-18 PROCEDURE — A9552 F18 FDG: HCPCS | Performed by: INTERNAL MEDICINE

## 2025-07-18 RX ADMIN — FLUDEOXYGLUCOSE F 18 1 DOSE: 200 INJECTION, SOLUTION INTRAVENOUS at 09:59

## 2025-07-18 NOTE — PROGRESS NOTES
July 18, 2025    Hello, may I speak with Sena Reyes?    My name is Bree.      I am  with [facility]  [address] [place] [address] [phone].    Before we get started may I verify your date of birth? 1980    I am calling to officially welcome you to our practice and ask about your recent visit. Is this a good time to talk? NO- Sent Personal Message    Tell me about your visit with us. What things went well?         We're always looking for ways to make our patients' experiences even better. Do you have recommendations on ways we may improve?      Overall were you satisfied with your first visit to our practice?        I appreciate you taking the time to speak with me today. Is there anything else I can do for you?       Thank you, and have a great day.

## 2025-07-24 ENCOUNTER — OFFICE VISIT (OUTPATIENT)
Dept: ONCOLOGY | Facility: HOSPITAL | Age: 45
End: 2025-07-24
Payer: COMMERCIAL

## 2025-07-24 VITALS
BODY MASS INDEX: 28.73 KG/M2 | HEART RATE: 73 BPM | OXYGEN SATURATION: 98 % | SYSTOLIC BLOOD PRESSURE: 128 MMHG | HEIGHT: 69 IN | RESPIRATION RATE: 18 BRPM | TEMPERATURE: 98.1 F | WEIGHT: 194 LBS | DIASTOLIC BLOOD PRESSURE: 72 MMHG

## 2025-07-24 DIAGNOSIS — R94.8 ABNORMAL POSITRON EMISSION TOMOGRAPHY (PET) SCAN: ICD-10-CM

## 2025-07-24 DIAGNOSIS — C88.40 PRIMARY CUTANEOUS MARGINAL ZONE B-CELL LYMPHOMA: Primary | ICD-10-CM

## 2025-07-24 DIAGNOSIS — N85.00 ENDOMETRIAL HYPERPLASIA: ICD-10-CM

## 2025-07-24 PROCEDURE — G0463 HOSPITAL OUTPT CLINIC VISIT: HCPCS | Performed by: INTERNAL MEDICINE

## 2025-07-24 RX ORDER — FLUCONAZOLE 150 MG/1
TABLET ORAL
COMMUNITY
Start: 2025-07-12

## 2025-07-24 NOTE — PROGRESS NOTES
Chief Complaint  atypical lymphoplasmacytic infiltrate consistent    Evens Bell MD Lutfi, Fedwa, MD Subjective          Sena Reyes presents to [facility] HEMATOLOGY & ONCOLOGY for cutaneous marginal zone lymphoma    Patient is a very pleasant 44-year-old female with past medical history of migraine, allergic rhinitis who presents for oncology evaluation regarding cutaneous marginal zone lymphoma.  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  Patient has been on Plaquenil and desonide topical for this with resolution.  Followed closely by dermatology.  Patient with right breast lesion biopsied by dermatology.  Pathology returned as atypical lymphoid plasmacytic infiltrate consistent with marginal zone lymphoma.  IgH rearrangement by PCR was positive for B-cell clonality.  Patient reports the lesion of her right breast hurts and itches. Has been present for about 6 months.  She has multiple other similar lesions throughout her torso.  She reports they are itch in the area of all developed since the initial lesion of her right breast.  She does have lesions on her back and legs that dermatology believes to be epidermoid cysts.     Interval History  Patient presents for follow-up. Patient had whole-body PET scan performed on 7/18/2025.  This showed a 1.2 cm hypermetabolic node in the inferior right axilla.  Hypermetabolic activity in endometrium.  This may be secondary to endometrial hyperplasia or possibly neoplasm. Results discussed.  Patient reports she started her period the day of the PET scan.  Patient has been using desonide cream to the lesion on her right breast.  This has improved the size of it as well as the itchiness.  She she also reports the other lesions on the left side of her back and torso have been present since the time of the right breast lesion.  She also uses desonide on these lesions but not as often.  They do occasionally  itch.  She is agreeable to seeing radiation. Feels well overall.     Review of Systems   Constitutional:  Positive for chills. Negative for fever and unexpected weight loss.   Genitourinary:         Right breast feels swollen   Skin:  Positive for skin lesions.   All other systems reviewed and are negative.    Current Outpatient Medications on File Prior to Visit   Medication Sig Dispense Refill    fluconazole (DIFLUCAN) 150 MG tablet       Atogepant (Qulipta) 60 MG tablet Take 1 tablet by mouth Daily. 90 tablet 1    Calcium Carb-Cholecalciferol 600-12.5 MG-MCG capsule Take  by mouth.      cetirizine (zyrTEC) 10 MG tablet Take 1 tablet by mouth Daily.      Co-Enzyme Q10 200 MG capsule Take 1 capsule by mouth Daily.      desonide (DESOWEN) 0.05 % lotion       famotidine (PEPCID) Taking 20 mg tablet daily      hydroxychloroquine (PLAQUENIL) 200 MG tablet Take 1 tablet by mouth Daily.      magnesium oxide (MAG-OX) 400 MG tablet Take 1 tablet by mouth Daily.      Paragard Intrauterine Copper intrauterine device IUD       ubrogepant (Ubrelvy) 100 MG tablet Take 1 tablet by mouth 1 (One) Time As Needed (migraine) for up to 30 doses. One tablet at HA onset, may repeat once in 2 hours if needed. 10 tablet 5     No current facility-administered medications on file prior to visit.       Allergies   Allergen Reactions    Sulfa Antibiotics Other (See Comments)     Pt would like to stay away from them due to her Lupus     Topiramate Other (See Comments)     Tingling in hands and feet, spotting blood     Past Medical History:   Diagnosis Date    Cutaneous lupus erythematosus     Factor V Leiden     Heterozygote    History of medical problems 2016    Blood Factor V Leiden    IUFD at less than 20 weeks of gestation 06/17/2021    Lupus Jan 2024    Migraine without aura     MTHFR mutation w NORMAL homocysteine     PMS (premenstrual syndrome)     Recurrent pregnancy loss, antepartum condition or complication     Urinary tract  "infection     Varicella     Visual impairment Age 20    Wear glasses     History reviewed. No pertinent surgical history.  Social History     Socioeconomic History    Marital status:      Spouse name: Gaudencio    Number of children: 5   Tobacco Use    Smoking status: Never     Passive exposure: Never    Smokeless tobacco: Never   Vaping Use    Vaping status: Never Used   Substance and Sexual Activity    Alcohol use: Never    Drug use: Never    Sexual activity: Yes     Partners: Male     Birth control/protection: I.U.D.     Family History   Problem Relation Age of Onset    Diabetes Father     Hearing loss Father     Heart disease Father     Hyperlipidemia Father     Stroke Father     Hypertension Father     Cervical cancer Mother 34    Hyperlipidemia Mother     Hypertension Mother     Heart disease Paternal Grandfather     Breast cancer Paternal Grandmother 60    Lymphoma Maternal Grandmother 58    Miscarriages / Stillbirths Maternal Grandmother     Diabetes Maternal Grandfather     Prostate cancer Maternal Uncle     Stroke Paternal Aunt     Colon cancer Neg Hx     Ovarian cancer Neg Hx     Uterine cancer Neg Hx     Deep vein thrombosis Neg Hx     Pulmonary embolism Neg Hx        Objective   Physical Exam  Well appearing patient in no acute distress on RA  Anicteric sclerae, + raised erythematous lesion about 4 cm in size right breast with small <1 cm satellite rasied lesion more medially  Respirations non-labored  Awake, alert, and oriented x 4. Speech intact. No gross neurologic deficit  Appropriate mood and affect    Vitals:    07/24/25 1447   BP: 128/72   Pulse: 73   Resp: 18   Temp: 98.1 °F (36.7 °C)   TempSrc: Oral   SpO2: 98%   Weight: 88 kg (194 lb 0.1 oz)   Height: 175.3 cm (69.02\")   PainSc: 0-No pain                 PHQ-9 Total Score:                Result Review :   The following data was reviewed by: Sam Roberts MD on 07/24/25:  Lab Results   Component Value Date    HGB 13.6 07/14/2025    " HCT 41.3 07/14/2025    MCV 93.0 07/14/2025     07/14/2025    WBC 7.88 07/14/2025    NEUTROABS 4.57 07/14/2025    LYMPHSABS 2.49 07/14/2025    MONOSABS 0.55 07/14/2025    EOSABS 0.17 07/14/2025    BASOSABS 0.09 07/14/2025     Lab Results   Component Value Date    GLUCOSE 124 (H) 07/14/2025    BUN 13.1 07/14/2025    CREATININE 0.90 07/14/2025     07/14/2025    K 4.2 07/14/2025     07/14/2025    CO2 23.3 07/14/2025    CALCIUM 10.0 07/14/2025    PROTEINTOT 7.6 07/14/2025    ALBUMIN 4.8 07/14/2025    BILITOT 0.4 07/14/2025    ALKPHOS 74 07/14/2025    AST 21 07/14/2025    ALT 24 07/14/2025     Lab Results   Component Value Date    FREET4 0.95 06/27/2022    TSH 1.380 10/30/2024            Labs personally reviewed. CBC normal. CMP normal outside of elevated glucose. LDH normal.     PET personally reviewed and per my independent read with no large hypermetabolic areas.      NM PET/CT Whole Body  Result Date: 7/22/2025  Impression: 1. 1.2 cm hypermetabolic node in the inferior right axilla. 2. Hypermetabolic activity in the endometrium. This may be secondary to endometrial hyperplasia or possibly neoplasm. Electronically Signed: Quincy Barrios MD  7/22/2025 12:52 PM EDT  Workstation ID: [id number]          Assessment and Plan    Diagnoses and all orders for this visit:    1. Primary cutaneous marginal zone B-cell lymphoma (Primary)  -     US Guided Lymph Node Biopsy; Future  -     Tissue Pathology Exam; Standing  -     Ambulatory Referral to Radiation Oncology    2. Abnormal positron emission tomography (PET) scan  -     US Guided Lymph Node Biopsy; Future  -     Tissue Pathology Exam; Standing    3. Endometrial hyperplasia        Primary cutaneous Marginal zone B cell lymphoma  Right breast lesion appeared about 6 to 7 months ago.  It has been biopsy-proven to be cutaneous marginal zone lymphoma.  Patient has similar lesions throughout her torso and left side of back.  They are smaller and nonpainful, but are  itchy.  They have all developed since her initial right breast lesion did.  Patient does report subjective night sweats.  She has occasional chills.  No fevers or weight loss.  No lymphadenopathy noted.  CBC, CMP, LDH 7/14/25 all normal.  Patient currently with clinical T3A disease.     7/18/25 PET showed a 1.2 cm axillary lymph node.  Otherwise exam was negative.  Will get ultrasound-guided biopsy of this to confirm if disease is present.      Plan for referral to radiation oncology.  Radiation can be considered to symptomatic lesions.  This would be her right breast lesion.  She is currently using desonide cream which has improved size of right breast lesion as well as itchiness.  She uses it occasionally on the other lesions that are on the left side of her back and torso.  Recommend continuing using topical steroid for the smaller lesions.  If her right axillary node is positive for disease, this can also be treated with radiation.  This was discussed with patient.  Would prefer to keep rituximab as therapy for relapsed or refractory disease.    F/u in 2 months.       Endometrial activity  Per PET.  Patient reports he started her period the day of the PET scan so this was likely the etiology.  Refer to gynecology.       I spent 30 minutes caring for Sena on this date of service. This time includes time spent by me in the following activities:preparing for the visit, reviewing tests, obtaining and/or reviewing a separately obtained history, performing a medically appropriate examination and/or evaluation , counseling and educating the patient/family/caregiver, ordering medications, tests, or procedures, referring and communicating with other health care professionals , documenting information in the medical record, independently interpreting results and communicating that information with the patient/family/caregiver, and care coordination    Patient Follow Up: 2 months  Patient was given instructions and  counseling regarding her condition or for health maintenance advice. Please see specific information pulled into the AVS if appropriate.

## 2025-07-30 ENCOUNTER — TELEPHONE (OUTPATIENT)
Dept: OBSTETRICS AND GYNECOLOGY | Age: 45
End: 2025-07-30
Payer: COMMERCIAL

## 2025-07-30 NOTE — TELEPHONE ENCOUNTER
Caller: Sena Reyes    Relationship to patient: Self    Best call back number: [phone]    Chief complaint: PT CURRENTLY SCHEDULED FOR 9-16 FOR PET SCAN F/U W/DR. BROWN, PT WOULD LIKE TO BE SEEN EARLIER AS ONCOLOGY DOCTOR SAW SOMETHING ON THE SCAN, NA AT OFFICE    Type of visit: ONCOLOGY F/U    Requested date: EARLIER     If rescheduling, when is the original appointment: 09-16     Additional notes:

## 2025-07-31 ENCOUNTER — OFFICE VISIT (OUTPATIENT)
Dept: NEUROLOGY | Facility: CLINIC | Age: 45
End: 2025-07-31
Payer: COMMERCIAL

## 2025-07-31 ENCOUNTER — PRIOR AUTHORIZATION (OUTPATIENT)
Dept: NEUROLOGY | Facility: CLINIC | Age: 45
End: 2025-07-31

## 2025-07-31 ENCOUNTER — PATIENT MESSAGE (OUTPATIENT)
Dept: NEUROLOGY | Facility: CLINIC | Age: 45
End: 2025-07-31

## 2025-07-31 VITALS
DIASTOLIC BLOOD PRESSURE: 75 MMHG | SYSTOLIC BLOOD PRESSURE: 113 MMHG | HEART RATE: 66 BPM | HEIGHT: 69 IN | BODY MASS INDEX: 28.41 KG/M2 | WEIGHT: 191.8 LBS

## 2025-07-31 DIAGNOSIS — R94.8 ABNORMAL POSITRON EMISSION TOMOGRAPHY (PET) SCAN: ICD-10-CM

## 2025-07-31 DIAGNOSIS — C88.40 PRIMARY CUTANEOUS MARGINAL ZONE B-CELL LYMPHOMA: Primary | ICD-10-CM

## 2025-07-31 DIAGNOSIS — G43.009 MIGRAINE WITHOUT AURA AND WITHOUT STATUS MIGRAINOSUS, NOT INTRACTABLE: Primary | ICD-10-CM

## 2025-07-31 RX ORDER — ZAVEGEPANT 10 MG/.1ML
1 SPRAY NASAL DAILY PRN
Qty: 6 EACH | Refills: 5 | Status: SHIPPED | OUTPATIENT
Start: 2025-07-31

## 2025-07-31 RX ORDER — B2/MAGNESIUM CIT,OXID/FEVERFEW 200-180-50
2 TABLET ORAL DAILY
COMMUNITY

## 2025-07-31 NOTE — TELEPHONE ENCOUNTER
Submitted via CMM  Per CMM: Patient eligibility could not be verified against date of service. Please review date of service.    I have sent pt a Mychart about insurance and she has read it, but not responded.

## 2025-07-31 NOTE — PROGRESS NOTES
Chief Complaint  Migraine    Subjective          Sena Reyes presents to [facility]  History of Present Illness    History of Present Illness  The patient presents to the office for follow-up.    She reports an improvement in her headaches, which she attributes to the use of an over-the-counter supplement called Migraine Relief. This supplement contains B12, magnesium, and feverfew. She has discontinued the use of Qulipta due to its high cost and the expiration of her prescription savings card. Currently, she experiences 2 to 4 headache days per month, with fewer episodes but longer durations in 2025. Her headaches are often triggered by prolonged sun exposure and dehydration. She maintains hydration and adequate sleep, which she believes helps manage her symptoms. She did not observe any significant changes when she stopped using Qulipta. Ubrelvy is effective in managing her headaches if taken at the onset of symptoms such as light sensitivity or sudden loud noises. However, it is less effective if taken after the headache has fully developed. She also notes that her headaches tend to occur just before and on the fourth day of her menstrual cycle.    She was recently diagnosed with marginal zone lymphoma and is considering radiation therapy. She is curious about potential migraine side effects from this treatment.    INTERVAL: Since last visit, she reports an improvement in her headaches and has started using an over-the-counter supplement called Migraine Relief. She has discontinued Qulipta due to its high cost and the expiration of her prescription savings card.    SOCIAL HISTORY:    Sleep: She maintains adequate sleep.    FAMILY HISTORY  - Daughter: Migraines    MEDICATIONS  CURRENT MEDS:  Ubrelvy As needed  Magnesium Feverfew Oral  PREVIOUS MEDS:  Qulipta Oral  Reason for Discontinuation: Too expensive, prescription savings card        Objective   Vital  "Signs:   /75 (BP Location: Right arm, Patient Position: Sitting, Cuff Size: Adult)   Pulse 66   Ht 175.3 cm (69.02\")   Wt 87 kg (191 lb 12.8 oz)   BMI 28.31 kg/m²     Physical Exam  HENT:      Head: Normocephalic.   Pulmonary:      Effort: Pulmonary effort is normal.   Neurological:      Mental Status: She is alert and oriented to person, place, and time.      Sensory: Sensation is intact.      Motor: Motor function is intact.      Coordination: Coordination is intact.      Deep Tendon Reflexes: Reflexes are normal and symmetric.        Neurological Exam  Mental Status  Alert. Oriented to person, place, and time.    Sensory  Normal sensation.    Reflexes  Deep tendon reflexes are 2+ and symmetric in all four extremities.    Coordination    Finger-to-nose, rapid alternating movements and heel-to-shin normal bilaterally without dysmetria.      Result Review :   CBC:  Lab Results   Component Value Date    WBC 7.88 07/14/2025    RBC 4.44 07/14/2025    HGB 13.6 07/14/2025    HCT 41.3 07/14/2025    MCV 93.0 07/14/2025    MCH 30.6 07/14/2025    MCHC 32.9 07/14/2025    RDW 12.7 07/14/2025     07/14/2025     CMP:  Lab Results   Component Value Date    BUN 13.1 07/14/2025    CREATININE 0.90 07/14/2025     07/14/2025    K 4.2 07/14/2025     07/14/2025    CALCIUM 10.0 07/14/2025    ALBUMIN 4.8 07/14/2025    BILITOT 0.4 07/14/2025    ALKPHOS 74 07/14/2025    AST 21 07/14/2025    ALT 24 07/14/2025                 Assessment and Plan    Diagnoses and all orders for this visit:    1. Migraine without aura and without status migrainosus, not intractable (Primary)    Other orders  -     ubrogepant (Ubrelvy) 100 MG tablet; Take 1 tablet by mouth 1 (One) Time As Needed (migraine) for up to 30 doses. One tablet at HA onset, may repeat once in 2 hours if needed.  Dispense: 10 tablet; Refill: 5  -     Zavegepant HCl (Zavzpret) 10 MG/ACT solution; Administer 1 spray into the nostril(s) as directed by provider " Daily As Needed (migraine).  Dispense: 6 each; Refill: 5        Assessment & Plan  1. Headaches.  She reports experiencing 2-4 headache days per month, with some correlation to her menstrual cycle and sun exposure. She has been using over-the-counter supplements containing magnesium and feverfew, which have provided some relief. Ubrelvy has been effective when taken early in the headache cycle. A prescription for Zavzpret nasal spray has been provided, along with a co-pay card. She is advised to use Zavzpret for headaches that have already escalated and continue using Ubrelvy for early symptoms. Instructions on the proper use of Zavzpret nasal spray were given, including not sniffing or tilting the head back during administration. The frequency of her headaches will be monitored, and prophylactic treatment will be considered if necessary.    2. Marginal zone lymphoma.  She has been diagnosed with marginal zone lymphoma and is undergoing radiation therapy targeting spots on her breast and torso. She is advised to stay hydrated during radiation therapy as it can be dehydrating. She should inform us if she experiences any complications related to her radiation treatment.         Follow Up   Return in about 6 months (around 1/31/2026) for Migraine f/u.  Patient was given instructions and counseling regarding her condition or for health maintenance advice. Please see specific information pulled into the AVS if appropriate.       Patient or patient representative verbalized consent for the use of Ambient Listening during the visit with  AMA Costa for chart documentation. 7/31/2025  10:13 EDT

## 2025-08-01 ENCOUNTER — CONSULT (OUTPATIENT)
Dept: RADIATION ONCOLOGY | Facility: HOSPITAL | Age: 45
End: 2025-08-01
Payer: COMMERCIAL

## 2025-08-01 ENCOUNTER — HOSPITAL ENCOUNTER (OUTPATIENT)
Dept: MAMMOGRAPHY | Facility: HOSPITAL | Age: 45
End: 2025-08-01
Payer: COMMERCIAL

## 2025-08-01 VITALS
RESPIRATION RATE: 16 BRPM | BODY MASS INDEX: 28.23 KG/M2 | SYSTOLIC BLOOD PRESSURE: 117 MMHG | OXYGEN SATURATION: 99 % | TEMPERATURE: 98.1 F | WEIGHT: 191.3 LBS | HEART RATE: 57 BPM | DIASTOLIC BLOOD PRESSURE: 76 MMHG

## 2025-08-01 DIAGNOSIS — C88.40 PRIMARY CUTANEOUS MARGINAL ZONE B-CELL LYMPHOMA: Primary | ICD-10-CM

## 2025-08-01 PROCEDURE — G0463 HOSPITAL OUTPT CLINIC VISIT: HCPCS | Performed by: RADIOLOGY

## 2025-08-01 NOTE — PROGRESS NOTES
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  She first developed a cutaneous right breast lesion in December 2024.  It is progressed in size since that time but has responded to topical steroids.  She reports experiencing pain and pruritus associated with this skin lesion.  She additionally has left lateral torso lesions that are pruritic.  She denies skin breaks or bleeding.  Pathology from biopsy performed on 5/8/2025 revealed atypical lymphoplasmacytic infiltrate consistent with marginal cell lymphoma.    Subjective      Review of Systems: Review of Systems   Constitutional:  Positive for fatigue (2/10). Negative for appetite change, chills, fever and unexpected weight change.   HENT:  Positive for tinnitus (ongoing). Negative for ear pain, facial swelling, sore throat and trouble swallowing.    Eyes:  Positive for visual disturbance (wears glasses).   Respiratory:  Negative for cough, chest tightness and shortness of breath.    Cardiovascular:  Negative for chest pain and palpitations.   Gastrointestinal:  Positive for abdominal pain (cramping) and nausea. Negative for abdominal distention, anal bleeding, blood in stool, constipation, diarrhea, rectal pain and vomiting.   Endocrine: Positive for heat intolerance (night sweats).   Genitourinary:  Positive for vaginal bleeding (spotting). Negative for decreased urine volume, difficulty urinating, dyspareunia, dysuria, flank pain, frequency, hematuria, pelvic pain, urgency, vaginal discharge and vaginal pain.        Postcoital bleeding      Musculoskeletal:  Positive for neck pain (ongoing) and neck stiffness  -- Message is from the [facility]--    Reason for Call: patient is returning a call from [facility]. Can you please assist patient and callback.     Caller Information       Type Contact Phone    10/11/2019 10:54 AM Phone (Incoming) Estevan Reyes (Self) [phone] (H)          Alternative phone number: n/a    Turnaround time given to caller:   \"This message will be sent to [state Provider's name]. The clinical team will fulfill your request as soon as they review your message.\"     (ongoing). Negative for back pain.        Fullness in right breast  Tingling, stabbing through right nipple    Skin:  Positive for color change (reddened spots throughout torso/back, using desonide with improvement). Negative for rash and wound.        Cyst on back, occasionally itches       Neurological:  Positive for headaches (ongoing). Negative for dizziness, tremors, seizures, syncope, weakness, light-headedness and numbness.   Psychiatric/Behavioral:  Positive for sleep disturbance. Negative for self-injury and suicidal ideas.        Past Medical History:   Past Medical History:   Diagnosis Date    Cutaneous lupus erythematosus     Factor V Leiden     Heterozygote    History of medical problems 2016    Blood Factor V Leiden    IUFD at less than 20 weeks of gestation 06/17/2021    Lupus Jan 2024    Lymphoma     Migraine without aura     MTHFR mutation w NORMAL homocysteine     PMS (premenstrual syndrome)     Recurrent pregnancy loss, antepartum condition or complication     Urinary tract infection     Varicella     Visual impairment Age 20    Wear glasses       Past Surgical History: History reviewed. No pertinent surgical history.    Family History:   Family History   Problem Relation Age of Onset    Cervical cancer Mother 34    Hyperlipidemia Mother     Hypertension Mother     Diabetes Father     Hearing loss Father     Heart disease Father     Hyperlipidemia Father     Stroke Father     Hypertension Father     Prostate cancer Maternal Uncle     Brain cancer Paternal Aunt     Stroke Paternal Aunt     Lymphoma Maternal Grandmother 58    Miscarriages / Stillbirths Maternal Grandmother     Diabetes Maternal Grandfather     Breast cancer Paternal Grandmother 60    Heart disease Paternal Grandfather     Cancer Maternal Great-Grandmother     Colon cancer Neg Hx     Ovarian cancer Neg Hx     Uterine cancer Neg Hx     Deep vein thrombosis Neg Hx     Pulmonary embolism Neg Hx        Social History:   Social History      Socioeconomic History    Marital status:      Spouse name: Gaudencio    Number of children: 5   Tobacco Use    Smoking status: Never     Passive exposure: Never    Smokeless tobacco: Never   Vaping Use    Vaping status: Never Used   Substance and Sexual Activity    Alcohol use: Never    Drug use: Never    Sexual activity: Defer       Medications:     Current Outpatient Medications:     Calcium Carb-Cholecalciferol 600-12.5 MG-MCG capsule, Take 2 capsules by mouth Daily., Disp: , Rfl:     cetirizine (zyrTEC) 10 MG tablet, Take 1 tablet by mouth Daily., Disp: , Rfl:     desonide (DESOWEN) 0.05 % lotion, Apply 1 Application topically to the appropriate area as directed 2 (Two) Times a Day., Disp: , Rfl:     famotidine (PEPCID), Taking 20 mg tablet daily, Disp: , Rfl:     hydroxychloroquine (PLAQUENIL) 200 MG tablet, Take 1 tablet by mouth Daily., Disp: , Rfl:     Paragard Intrauterine Copper intrauterine device IUD, , Disp: , Rfl:     Riboflavin-Magnesium-Feverfew (MigreLief) 200-180-50 MG tablet, Take 2 tablets by mouth Daily., Disp: , Rfl:     ubrogepant (Ubrelvy) 100 MG tablet, Take 1 tablet by mouth 1 (One) Time As Needed (migraine) for up to 30 doses. One tablet at HA onset, may repeat once in 2 hours if needed., Disp: 10 tablet, Rfl: 5    Zavegepant HCl (Zavzpret) 10 MG/ACT solution, Administer 1 spray into the nostril(s) as directed by provider Daily As Needed (migraine). (Patient not taking: Reported on 8/1/2025), Disp: 6 each, Rfl: 5    Allergies:   Allergies   Allergen Reactions    Sulfa Antibiotics Other (See Comments)     Pt would like to stay away from them due to her Lupus     Topiramate Other (See Comments)     Tingling in hands and feet, spotting blood       Pain: (on a scale of 0-10)   Pain Score    08/01/25 0749   PainSc: 0-No pain       Sena Reyes reports a pain score of 0.  Given her pain assessment as noted, treatment options were discussed and the following options were decided upon as a  follow-up plan to address the patient's pain: continuation of current treatment plan for pain.     Advanced Care Plan: N   Quality of Life: 100 - Full Activity    Objective     Physical Exam:   Vital Signs:   Vitals:    08/01/25 0749   BP: 117/76   Pulse: 57   Resp: 16   Temp: 98.1 °F (36.7 °C)   TempSrc: Oral   SpO2: 99%   Weight: 86.8 kg (191 lb 4.8 oz)   PainSc: 0-No pain     Body mass index is 28.23 kg/m².     Physical Exam  Constitutional:       General: She is not in acute distress.     Appearance: She is normal weight. She is not ill-appearing or toxic-appearing.   HENT:      Head: Normocephalic and atraumatic.   Pulmonary:      Effort: Pulmonary effort is normal. No respiratory distress.   Skin:     General: Skin is warm and dry.      Coloration: Skin is not jaundiced.      Comments: Slightly raised, purple-kar non-tender right anterior breast skin nodule measuring 0.5 cm adjacent to another skin lesion measuring approximately 1.5 cm.;  At left lateral abdomen, two 1 cm lesions of similar appearance compared to the right breast skin lesion   Neurological:      General: No focal deficit present.      Mental Status: She is alert and oriented to person, place, and time.      Cranial Nerves: No cranial nerve deficit.      Gait: Gait normal.   Psychiatric:         Mood and Affect: Mood normal.         Behavior: Behavior normal.         Judgment: Judgment normal.         Results:   Radiographs: NM PET/CT Whole Body  Result Date: 7/22/2025  Impression: 1. 1.2 cm hypermetabolic node in the inferior right axilla. 2. Hypermetabolic activity in the endometrium. This may be secondary to endometrial hyperplasia or possibly neoplasm. Electronically Signed: Quincy Barrios MD  7/22/2025 12:52 PM EDT  Workstation ID: [id number]  I personally reviewed the PET/CT performed on 7/18/2025.  The pertinent findings are as above.      Pathology: I personally reviewed the pathology report from the procedure performed on 5/8/2025 at  [facility].  The pertinent findings are as above in HPI.    Labs:   WBC   Date Value Ref Range Status   07/14/2025 7.88 3.40 - 10.80 10*3/mm3 Final     Hemoglobin   Date Value Ref Range Status   07/14/2025 13.6 12.0 - 15.9 g/dL Final     Hematocrit   Date Value Ref Range Status   07/14/2025 41.3 34.0 - 46.6 % Final     Platelets   Date Value Ref Range Status   07/14/2025 294 140 - 450 10*3/mm3 Final       Assessment / Plan        Assessment/Plan:   Sena Reyes is a 44-year-old female with cutaneous B-cell lymphoma involving the skin of the right breast and skin of the left lateral torso.  She additionally has a right axillary lymph node suspicious for involvement.  ECOG 0    I discussed the clinical, radiographic and pathologic findings today with Mrs. Reyes.  I explained the role of radiotherapy in the management of cutaneous B-cell lymphoma.  I explained that while radiotherapy can result in complete clinical response, I expect her to develop additional skin lesions after completion of radiotherapy.  If she presented with a continuous spread of skin lesions, I might find radiotherapy to be a reasonable initial and potentially definitive management option.  However, given the discontiguous nature of her skin lesions, I expect her to develop multiple skin lesions throughout her body and a rather unpredictable pattern of spread.  Additionally, treatment of her right breast skin lesion would result in radiotherapy to breast parenchyma which I would try to avoid in a young patient given the risk of secondary malignancy.  She will return to Dr. Roberts for consideration of rituximab or other systemic therapy.  Should her disease be refractory to systemic therapy, we may revisit the potential for external beam radiotherapy.  She will follow-up with me in 3 months.      Víctor Jones MD  Radiation Oncology  [facility]    This document has been signed by Víctor Jones MD on August 1, 2025 09:14  EDT

## 2025-08-05 ENCOUNTER — HOSPITAL ENCOUNTER (OUTPATIENT)
Dept: CT IMAGING | Facility: HOSPITAL | Age: 45
Discharge: HOME | End: 2025-08-05
Admitting: NURSE PRACTITIONER
Payer: COMMERCIAL

## 2025-08-05 VITALS
HEIGHT: 69 IN | SYSTOLIC BLOOD PRESSURE: 103 MMHG | BODY MASS INDEX: 28.14 KG/M2 | WEIGHT: 190 LBS | HEART RATE: 64 BPM | RESPIRATION RATE: 21 BRPM | DIASTOLIC BLOOD PRESSURE: 66 MMHG | OXYGEN SATURATION: 99 %

## 2025-08-05 DIAGNOSIS — C88.40 PRIMARY CUTANEOUS MARGINAL ZONE B-CELL LYMPHOMA: ICD-10-CM

## 2025-08-05 DIAGNOSIS — R94.8 ABNORMAL POSITRON EMISSION TOMOGRAPHY (PET) SCAN: ICD-10-CM

## 2025-08-05 LAB
APTT PPP: 27.9 SECONDS (ref 24.2–34.2)
INR PPP: 0.98 (ref 0.86–1.15)
PROTHROMBIN TIME: 13.4 SECONDS (ref 11.8–14.9)

## 2025-08-05 PROCEDURE — 88341 IMHCHEM/IMCYTCHM EA ADD ANTB: CPT | Performed by: NURSE PRACTITIONER

## 2025-08-05 PROCEDURE — 77012 CT SCAN FOR NEEDLE BIOPSY: CPT

## 2025-08-05 PROCEDURE — 85610 PROTHROMBIN TIME: CPT | Performed by: RADIOLOGY

## 2025-08-05 PROCEDURE — 88305 TISSUE EXAM BY PATHOLOGIST: CPT | Performed by: NURSE PRACTITIONER

## 2025-08-05 PROCEDURE — 88342 IMHCHEM/IMCYTCHM 1ST ANTB: CPT | Performed by: NURSE PRACTITIONER

## 2025-08-05 PROCEDURE — 85730 THROMBOPLASTIN TIME PARTIAL: CPT | Performed by: RADIOLOGY

## 2025-08-05 RX ORDER — LIDOCAINE HYDROCHLORIDE 20 MG/ML
INJECTION, SOLUTION INFILTRATION; PERINEURAL
Status: DISPENSED
Start: 2025-08-05 | End: 2025-08-05

## 2025-08-07 LAB
CYTO UR: NORMAL
LAB AP CASE REPORT: NORMAL
LAB AP CLINICAL INFORMATION: NORMAL
LAB AP DIAGNOSIS COMMENT: NORMAL
LAB AP SPECIAL STAINS: NORMAL
Lab: NORMAL
PATH REPORT.FINAL DX SPEC: NORMAL
PATH REPORT.GROSS SPEC: NORMAL

## 2025-08-14 PROBLEM — R94.8 ABNORMAL POSITRON EMISSION TOMOGRAPHY (PET) SCAN: Status: ACTIVE | Noted: 2025-08-14

## 2025-08-15 ENCOUNTER — OFFICE VISIT (OUTPATIENT)
Dept: OBSTETRICS AND GYNECOLOGY | Age: 45
End: 2025-08-15
Payer: COMMERCIAL

## 2025-08-15 VITALS
BODY MASS INDEX: 28.58 KG/M2 | WEIGHT: 193 LBS | HEIGHT: 69 IN | DIASTOLIC BLOOD PRESSURE: 67 MMHG | SYSTOLIC BLOOD PRESSURE: 115 MMHG | HEART RATE: 61 BPM

## 2025-08-15 DIAGNOSIS — Z80.3 FAMILY HISTORY OF BREAST CANCER: ICD-10-CM

## 2025-08-15 DIAGNOSIS — N64.4 MASTALGIA: ICD-10-CM

## 2025-08-15 DIAGNOSIS — N63.0 BREAST MASS IN FEMALE: ICD-10-CM

## 2025-08-15 DIAGNOSIS — R94.8 ABNORMAL POSITRON EMISSION TOMOGRAPHY (PET) SCAN: Primary | ICD-10-CM

## 2025-08-15 PROCEDURE — 88305 TISSUE EXAM BY PATHOLOGIST: CPT | Performed by: OBSTETRICS & GYNECOLOGY

## 2025-08-19 LAB
CYTO UR: NORMAL
LAB AP CASE REPORT: NORMAL
LAB AP CLINICAL INFORMATION: NORMAL
PATH REPORT.FINAL DX SPEC: NORMAL
PATH REPORT.GROSS SPEC: NORMAL

## 2025-08-21 ENCOUNTER — OFFICE VISIT (OUTPATIENT)
Dept: ONCOLOGY | Facility: HOSPITAL | Age: 45
End: 2025-08-21
Payer: COMMERCIAL

## 2025-08-21 VITALS
DIASTOLIC BLOOD PRESSURE: 54 MMHG | OXYGEN SATURATION: 100 % | BODY MASS INDEX: 28.77 KG/M2 | WEIGHT: 194.22 LBS | HEART RATE: 64 BPM | HEIGHT: 69 IN | TEMPERATURE: 97.6 F | RESPIRATION RATE: 18 BRPM | SYSTOLIC BLOOD PRESSURE: 128 MMHG

## 2025-08-21 DIAGNOSIS — C88.40 PRIMARY CUTANEOUS MARGINAL ZONE B-CELL LYMPHOMA: Primary | ICD-10-CM

## 2025-08-21 PROCEDURE — G0463 HOSPITAL OUTPT CLINIC VISIT: HCPCS | Performed by: INTERNAL MEDICINE

## 2025-08-25 ENCOUNTER — TELEPHONE (OUTPATIENT)
Dept: OBSTETRICS AND GYNECOLOGY | Age: 45
End: 2025-08-25
Payer: COMMERCIAL

## 2025-08-25 LAB
Lab: NORMAL
Lab: NORMAL